# Patient Record
Sex: MALE | Race: BLACK OR AFRICAN AMERICAN | NOT HISPANIC OR LATINO
[De-identification: names, ages, dates, MRNs, and addresses within clinical notes are randomized per-mention and may not be internally consistent; named-entity substitution may affect disease eponyms.]

---

## 2020-03-18 ENCOUNTER — TRANSCRIPTION ENCOUNTER (OUTPATIENT)
Age: 45
End: 2020-03-18

## 2020-05-12 ENCOUNTER — TRANSCRIPTION ENCOUNTER (OUTPATIENT)
Age: 45
End: 2020-05-12

## 2020-06-12 ENCOUNTER — APPOINTMENT (OUTPATIENT)
Dept: UROLOGY | Facility: CLINIC | Age: 45
End: 2020-06-12
Payer: COMMERCIAL

## 2020-06-12 VITALS — TEMPERATURE: 97.6 F

## 2020-06-12 VITALS
BODY MASS INDEX: 25.73 KG/M2 | HEART RATE: 60 BPM | DIASTOLIC BLOOD PRESSURE: 77 MMHG | WEIGHT: 190 LBS | SYSTOLIC BLOOD PRESSURE: 142 MMHG | RESPIRATION RATE: 17 BRPM | HEIGHT: 72 IN | TEMPERATURE: 97 F

## 2020-06-12 PROCEDURE — 99204 OFFICE O/P NEW MOD 45 MIN: CPT

## 2020-06-15 LAB
APPEARANCE: CLEAR
BACTERIA: NEGATIVE
BILIRUBIN URINE: NEGATIVE
BLOOD URINE: NEGATIVE
COLOR: NORMAL
GLUCOSE QUALITATIVE U: NEGATIVE
HYALINE CASTS: 0 /LPF
KETONES URINE: NEGATIVE
LEUKOCYTE ESTERASE URINE: NEGATIVE
MICROSCOPIC-UA: NORMAL
NITRITE URINE: NEGATIVE
PH URINE: 6.5
PROTEIN URINE: NEGATIVE
RED BLOOD CELLS URINE: 0 /HPF
SPECIFIC GRAVITY URINE: 1.01
SQUAMOUS EPITHELIAL CELLS: 0 /HPF
UROBILINOGEN URINE: NORMAL
WHITE BLOOD CELLS URINE: 0 /HPF

## 2020-06-15 NOTE — PHYSICAL EXAM
[General Appearance - Well Nourished] : well nourished [General Appearance - Well Developed] : well developed [Well Groomed] : well groomed [Normal Appearance] : normal appearance [Edema] : no peripheral edema [General Appearance - In No Acute Distress] : no acute distress [Exaggerated Use Of Accessory Muscles For Inspiration] : no accessory muscle use [Respiration, Rhythm And Depth] : normal respiratory rhythm and effort [Abdomen Tenderness] : non-tender [Abdomen Soft] : soft [Costovertebral Angle Tenderness] : no ~M costovertebral angle tenderness [Urethral Meatus] : meatus normal [Scrotum] : the scrotum was normal [Urinary Bladder Findings] : the bladder was normal on palpation [Testes Mass (___cm)] : there were no testicular masses [Normal Station and Gait] : the gait and station were normal for the patient's age [No Focal Deficits] : no focal deficits [Oriented To Time, Place, And Person] : oriented to person, place, and time [] : no rash [Mood] : the mood was normal [Affect] : the affect was normal [Not Anxious] : not anxious [No Palpable Adenopathy] : no palpable adenopathy

## 2020-06-15 NOTE — LETTER BODY
[FreeTextEntry1] : Adriana Cabrera MD\par [address]\[place] [address]\par \par Dear Dr. Cabrera,\par \par I had the pleasure of meeting Weston Grayson in the office today.  As you know, he is a 44-year-old man who was referred for evaluation of a renal cyst.  The cyst was detected when he was found to have an elevation of his creatinine level of around 1.7 mg/dL and a renal sonogram was ordered.  Findings did not show any evidence of hydronephrosis or stone disease but he was found to have a complex renal cyst within the right kidney.  Further imaging was then obtained including an MRI with and without contrast for further assessment.  The MRI has now been done and he is back today for follow-up.  Notably, his serum creatinine level was again repeated just recently and is now 1.91 mg/dL.  The patient does not have any prior known history of medical renal disease.  He denies any prior involvement with a nephrologist.  He has no history of gross hematuria.  He denies any other underlying medical conditions or use of nonsteroidal anti-inflammatories.  He is not on any antihypertensive medication.  He currently takes turmeric and multivitamins.  The patient also denies any prior known history of medical renal disease within his family.\par \par I reviewed the MRI findings with the patient.  They show the cyst within the right kidney to be 3.2 cm and septated but nonenhancing and without any solid components.  This cyst appears to be mildly complex but there is no concern based on the imaging findings related to malignancy.  With these findings in mind, I think it is unlikely he will need any follow-up intervention here and I would only suggest reimaging if he were to develop additional symptoms or other medical issues warranting repeat imaging of the kidneys.\par \par The further increase in his creatinine level is somewhat concerning to me.  He is young and has no other known history that would potentially predispose him to medical renal disease although it appears he may be showing some early signs of this.  I would strongly recommend he see a nephrologist for additional evaluation and I have provided him with the name of a colleague for this purpose.  I have explained to him that he should avoid nonsteroidal anti-inflammatories and remain hydrated to help maximize his renal function and in addition, I explained to him that he would likely need to undergo additional urine and blood work in order to further assess the causes of this recent laboratory finding.\par \par Thank you very much for the kind referral.  Please do not hesitate to contact me with any questions or concerns.\par \par Sincerely,\par \par \par \par \par Gerardo Montes MD, FACS\par  of Urology\par Residency \par [facility]\par \par [facility]\par Director of Robotics and Minimally Invasive Surgery\par [address] [place] [address]\[place] [address]\par P: [phone]\par F: [phone]\par [facility]

## 2020-06-19 ENCOUNTER — TRANSCRIPTION ENCOUNTER (OUTPATIENT)
Age: 45
End: 2020-06-19

## 2020-06-19 ENCOUNTER — APPOINTMENT (OUTPATIENT)
Dept: NEPHROLOGY | Facility: CLINIC | Age: 45
End: 2020-06-19
Payer: COMMERCIAL

## 2020-06-19 DIAGNOSIS — Z80.42 FAMILY HISTORY OF MALIGNANT NEOPLASM OF PROSTATE: ICD-10-CM

## 2020-06-19 LAB — HBA1C TO ESTIMATED AVERAGE GLUCOSE CONVERTER - CONVERTED GLUCOSE: NORMAL

## 2020-06-19 PROCEDURE — 99205 OFFICE O/P NEW HI 60 MIN: CPT | Mod: 95

## 2020-06-19 NOTE — HISTORY OF PRESENT ILLNESS
[Home] : at home, [unfilled] , at the time of the visit. [Medical Office: ([facility])___] : at the medical office located in  [Verbal consent obtained from patient] : the patient, [unfilled] [FreeTextEntry1] : Patient referred by Dr Montes Urology due to elevated creatinine.\par Patient was sent to Dr Montes by PMD (Dr Cabrera, [facility], [facility]) because when she saw bloodwork she saw elevated creatinine level and also a renal cyst.  Was sent to Dr Montes for further evaluation of both. June 3rd renal ultrasound showed right kidney 10.0 cm and a complex cyst 2.7cm largest diameter, left kidney 9.4cm; Pt is 6 feet tall. MRI done subsequently showed RLP 3.2cm septated nonenhancing cyst.  Seen by Dr Montes June 12th, and was told repeat sonos every 6 months and he was referred to me.\par \par Labs notable for cr being elevated 2 months ago in April 1.75 and then 1.91 June 1st. Patient started seeing PMD in April and was told his creatinine was elevated.  He does recall 2-3 years ago, previous primary care doctor told him creatinine levels sightly elevated.  He was not recommended to see a nephrologist.  Does not recall anything abnormal prior to 2-3 years ago. (Dr Ruiz was PMD at that time)\par \par Patient denies medical problems.  Denies HTN, DM; possible pre DM 8 years ago but he improved his diet.  \par Denies hematuria or proteinuria in the urine.  Denies foamy urine or bubbly urine or gross hematuria.  \par Patient tested positive for COVID-19 end of  March.  Had cough and chills, fever sob,104s, no vomiting, no diarrhea.  Was not given anything.  Only took tylenol for headaches at that time. NO NSAIDS or antibiotics given at this time.\par He states he was using NSAIDS ALLEVE 10 years ago a lot at the time of  left knee tibial plateau injury  surgery\par DENIES NSAIDs Now\par \par Reports BP being elevated at PMD office 144/70s; Prior to that BP has always been normal. \par meds: Taking Tumeric pills for years buys vitamin shop or local pharmacy ([employer] NUTRiTION TUMERIC 500mg daily) or [employer]  and fish oil daily 1200mg ([employer]) now due to knee pain. NO NSAIDS, \par denies protein or creatinine supplements now; Was taking protein powders but stopped all in March 2020, had creatine in it. He was taking the Creatinine supplements for years >5 years.  [id number] body building competition had taken diuretics for less than one week; denies anabolic steroids, no injections.  \par \par Surgical hx: tibial plateau surgery\par \par ALL: NKDA\par \par Soc hx: denies smoking, etoh occasion, denies drug use now or in the past.  Patient works HVAC AC/servic tech.  goes to gym.  works out 5-6days a week, lifts weights, resistence. was doing this 2-3 years ago. \par \par Fam hx: father had prostate ca, mother preDM, preHTN, mother mother-GM-DM; Both parents still living.  Patient does not have siblings.  Has two children 20 and 15 years old healthy boys- no pmedical issues for them\par \par \par 6 feet, 190lbs; 2016 he did a body building competition and had to drop to 170s.  Never overweight, obese.  Has muscle mass per patient.

## 2020-07-10 ENCOUNTER — LABORATORY RESULT (OUTPATIENT)
Age: 45
End: 2020-07-10

## 2020-07-13 LAB
ALBUMIN SERPL ELPH-MCNC: 4.8 G/DL
ALBUMIN SERPL ELPH-MCNC: 4.8 G/DL
ALP BLD-CCNC: 68 U/L
ALT SERPL-CCNC: 31 U/L
ANA SER IF-ACNC: NEGATIVE
ANION GAP SERPL CALC-SCNC: 13 MMOL/L
APPEARANCE: CLEAR
APTT BLD: 30.4 SEC
AST SERPL-CCNC: 38 U/L
BACTERIA: NEGATIVE
BASOPHILS # BLD AUTO: 0.06 K/UL
BASOPHILS NFR BLD AUTO: 1.2 %
BILIRUB DIRECT SERPL-MCNC: 0.1 MG/DL
BILIRUB INDIRECT SERPL-MCNC: 0.2 MG/DL
BILIRUB SERPL-MCNC: 0.3 MG/DL
BILIRUBIN URINE: NEGATIVE
BLOOD URINE: NEGATIVE
BUN SERPL-MCNC: 18 MG/DL
C3 SERPL-MCNC: 102 MG/DL
C4 SERPL-MCNC: 29 MG/DL
CALCIUM SERPL-MCNC: 10.2 MG/DL
CHLORIDE SERPL-SCNC: 101 MMOL/L
CK SERPL-CCNC: 1211 U/L
CO2 SERPL-SCNC: 26 MMOL/L
COLOR: NORMAL
CREAT SERPL-MCNC: 1.63 MG/DL
CREAT SPEC-SCNC: 66 MG/DL
CREAT/PROT UR: NORMAL RATIO
DEPRECATED KAPPA LC FREE/LAMBDA SER: 1.69 RATIO
DSDNA AB SER-ACNC: <12 IU/ML
EOSINOPHIL # BLD AUTO: 0.46 K/UL
EOSINOPHIL NFR BLD AUTO: 9.4 %
FERRITIN SERPL-MCNC: 203 NG/ML
GBM AB TITR SER IF: <1 AL
GLUCOSE QUALITATIVE U: NEGATIVE
GLUCOSE SERPL-MCNC: 92 MG/DL
HBV CORE IGG+IGM SER QL: NONREACTIVE
HBV CORE IGM SER QL: NONREACTIVE
HBV SURFACE AB SER QL: REACTIVE
HBV SURFACE AB SERPL IA-ACNC: 392.8 MIU/ML
HCT VFR BLD CALC: 43.5 %
HCV AB SER QL: NONREACTIVE
HCV S/CO RATIO: 0.14 S/CO
HGB BLD-MCNC: 14.6 G/DL
HIV1+2 AB SPEC QL IA.RAPID: NONREACTIVE
HYALINE CASTS: 0 /LPF
IMM GRANULOCYTES NFR BLD AUTO: 0.2 %
INR PPP: 1.07 RATIO
IRON SATN MFR SERPL: 26 %
IRON SERPL-MCNC: 63 UG/DL
KAPPA LC CSF-MCNC: 0.78 MG/DL
KAPPA LC SERPL-MCNC: 1.32 MG/DL
KETONES URINE: NEGATIVE
LEUKOCYTE ESTERASE URINE: NEGATIVE
LYMPHOCYTES # BLD AUTO: 1.83 K/UL
LYMPHOCYTES NFR BLD AUTO: 37.5 %
MAN DIFF?: NORMAL
MCHC RBC-ENTMCNC: 28.7 PG
MCHC RBC-ENTMCNC: 33.6 GM/DL
MCV RBC AUTO: 85.6 FL
MICROSCOPIC-UA: NORMAL
MONOCYTES # BLD AUTO: 0.45 K/UL
MONOCYTES NFR BLD AUTO: 9.2 %
MPO AB + PR3 PNL SER: NORMAL
NEUTROPHILS # BLD AUTO: 2.07 K/UL
NEUTROPHILS NFR BLD AUTO: 42.5 %
NITRITE URINE: NEGATIVE
PH URINE: 6
PHOSPHATE SERPL-MCNC: 3.4 MG/DL
PLATELET # BLD AUTO: 228 K/UL
POTASSIUM SERPL-SCNC: 4.3 MMOL/L
PROT SERPL-MCNC: 7 G/DL
PROT UR-MCNC: <4 MG/DL
PROTEIN URINE: NEGATIVE
PT BLD: 12.6 SEC
RBC # BLD: 5.08 M/UL
RBC # FLD: 13.2 %
RED BLOOD CELLS URINE: 0 /HPF
RPR SER-TITR: NORMAL
SODIUM SERPL-SCNC: 141 MMOL/L
SPECIFIC GRAVITY URINE: 1.01
SQUAMOUS EPITHELIAL CELLS: 0 /HPF
TIBC SERPL-MCNC: 243 UG/DL
UIBC SERPL-MCNC: 180 UG/DL
UROBILINOGEN URINE: NORMAL
WBC # FLD AUTO: 4.88 K/UL
WHITE BLOOD CELLS URINE: 1 /HPF

## 2020-07-15 LAB
ALBUMIN MFR SERPL ELPH: 63 %
ALBUMIN SERPL-MCNC: 4.4 G/DL
ALBUMIN/GLOB SERPL: 1.7 RATIO
ALPHA1 GLOB MFR SERPL ELPH: 3.1 %
ALPHA1 GLOB SERPL ELPH-MCNC: 0.2 G/DL
ALPHA2 GLOB MFR SERPL ELPH: 7.5 %
ALPHA2 GLOB SERPL ELPH-MCNC: 0.5 G/DL
B-GLOBULIN MFR SERPL ELPH: 11.8 %
B-GLOBULIN SERPL ELPH-MCNC: 0.8 G/DL
GAMMA GLOB FLD ELPH-MCNC: 1 G/DL
GAMMA GLOB MFR SERPL ELPH: 14.6 %
INTERPRETATION SERPL IEP-IMP: NORMAL
M PROTEIN SPEC IFE-MCNC: NORMAL
PHOSPHOLIPASE A2 RECEPTOR ELISA: <2 RU/ML
PHOSPHOLIPASE A2 RECEPTOR IFA: NEGATIVE
PROT SERPL-MCNC: 7 G/DL
PROT SERPL-MCNC: 7 G/DL

## 2020-08-18 LAB
ALBUMIN SERPL ELPH-MCNC: 4.8 G/DL
ANION GAP SERPL CALC-SCNC: 18 MMOL/L
APPEARANCE: CLEAR
APTT BLD: 29.8 SEC
BACTERIA: NEGATIVE
BASOPHILS # BLD AUTO: 0.07 K/UL
BASOPHILS NFR BLD AUTO: 1.3 %
BILIRUBIN URINE: NEGATIVE
BLOOD URINE: NEGATIVE
BUN SERPL-MCNC: 15 MG/DL
CALCIUM SERPL-MCNC: 10.2 MG/DL
CHLORIDE SERPL-SCNC: 102 MMOL/L
CK BB SERPL ELPH-CCNC: 0 % (ref 0–?)
CK MB CFR SERPL ELPH: 0 %
CK MM SERPL ELPH-CCNC: 100 %
CK SERPL-CCNC: 839 U/L
CO2 SERPL-SCNC: 24 MMOL/L
COLOR: NORMAL
CREAT SERPL-MCNC: 1.57 MG/DL
CREAT SPEC-SCNC: 134 MG/DL
CREAT/PROT UR: 0.1 RATIO
CREATINE KINASE,TOTAL,SERUM: 820 U/L
CYSTATIN C SERPL-MCNC: 0.89 MG/L
DEPRECATED KAPPA LC FREE/LAMBDA SER: 1.79 RATIO
EOSINOPHIL # BLD AUTO: 0.42 K/UL
EOSINOPHIL NFR BLD AUTO: 7.5 %
GFR/BSA.PRED SERPLBLD CYS-BASED-ARV: 96 ML/MIN
GLUCOSE QUALITATIVE U: NEGATIVE
GLUCOSE SERPL-MCNC: 83 MG/DL
HCT VFR BLD CALC: 47.4 %
HGB BLD-MCNC: 15.2 G/DL
HYALINE CASTS: 0 /LPF
IMM GRANULOCYTES NFR BLD AUTO: 0.4 %
INR PPP: 1 RATIO
KAPPA LC CSF-MCNC: 0.89 MG/DL
KAPPA LC SERPL-MCNC: 1.59 MG/DL
KETONES URINE: NEGATIVE
LEUKOCYTE ESTERASE URINE: NEGATIVE
LYMPHOCYTES # BLD AUTO: 2.09 K/UL
LYMPHOCYTES NFR BLD AUTO: 37.3 %
MACRO TYPE 1: 0 %
MACRO TYPE 2: 0 %
MAN DIFF?: NORMAL
MCHC RBC-ENTMCNC: 28 PG
MCHC RBC-ENTMCNC: 32.1 GM/DL
MCV RBC AUTO: 87.5 FL
MICROSCOPIC-UA: NORMAL
MONOCYTES # BLD AUTO: 0.49 K/UL
MONOCYTES NFR BLD AUTO: 8.8 %
NEUTROPHILS # BLD AUTO: 2.51 K/UL
NEUTROPHILS NFR BLD AUTO: 44.7 %
NITRITE URINE: NEGATIVE
PH URINE: 6.5
PHOSPHATE SERPL-MCNC: 3 MG/DL
PLATELET # BLD AUTO: 248 K/UL
POTASSIUM SERPL-SCNC: 4.3 MMOL/L
PROT UR-MCNC: 7 MG/DL
PROTEIN URINE: NEGATIVE
PT BLD: 11.8 SEC
RBC # BLD: 5.42 M/UL
RBC # FLD: 14.2 %
RED BLOOD CELLS URINE: 0 /HPF
SARS-COV-2 IGG SERPL IA-ACNC: 2.41 INDEX
SARS-COV-2 IGG SERPL QL IA: POSITIVE
SODIUM SERPL-SCNC: 144 MMOL/L
SPECIFIC GRAVITY URINE: 1.02
SQUAMOUS EPITHELIAL CELLS: 0 /HPF
UROBILINOGEN URINE: NORMAL
WBC # FLD AUTO: 5.6 K/UL
WHITE BLOOD CELLS URINE: 0 /HPF

## 2020-09-11 ENCOUNTER — OUTPATIENT (OUTPATIENT)
Dept: OUTPATIENT SERVICES | Facility: HOSPITAL | Age: 45
LOS: 1 days | End: 2020-09-11
Payer: COMMERCIAL

## 2020-09-11 DIAGNOSIS — Z11.59 ENCOUNTER FOR SCREENING FOR OTHER VIRAL DISEASES: ICD-10-CM

## 2020-09-11 PROCEDURE — U0003: CPT

## 2020-09-12 LAB — SARS-COV-2 RNA SPEC QL NAA+PROBE: SIGNIFICANT CHANGE UP

## 2020-09-14 ENCOUNTER — OUTPATIENT (OUTPATIENT)
Dept: OUTPATIENT SERVICES | Facility: HOSPITAL | Age: 45
LOS: 1 days | End: 2020-09-14
Payer: COMMERCIAL

## 2020-09-14 ENCOUNTER — RESULT REVIEW (OUTPATIENT)
Age: 45
End: 2020-09-14

## 2020-09-14 ENCOUNTER — APPOINTMENT (OUTPATIENT)
Dept: ULTRASOUND IMAGING | Facility: HOSPITAL | Age: 45
End: 2020-09-14

## 2020-09-14 DIAGNOSIS — R10.9 UNSPECIFIED ABDOMINAL PAIN: ICD-10-CM

## 2020-09-14 PROCEDURE — 88305 TISSUE EXAM BY PATHOLOGIST: CPT | Mod: 26

## 2020-09-14 PROCEDURE — 88305 TISSUE EXAM BY PATHOLOGIST: CPT

## 2020-09-14 PROCEDURE — 88348 ELECTRON MICROSCOPY DX: CPT

## 2020-09-14 PROCEDURE — 88346 IMFLUOR 1ST 1ANTB STAIN PX: CPT

## 2020-09-14 PROCEDURE — 50200 RENAL BIOPSY PERQ: CPT | Mod: LT

## 2020-09-14 PROCEDURE — 76942 ECHO GUIDE FOR BIOPSY: CPT

## 2020-09-14 PROCEDURE — 88350 IMFLUOR EA ADDL 1ANTB STN PX: CPT | Mod: 26

## 2020-09-14 PROCEDURE — 88350 IMFLUOR EA ADDL 1ANTB STN PX: CPT

## 2020-09-14 PROCEDURE — 88346 IMFLUOR 1ST 1ANTB STAIN PX: CPT | Mod: 26

## 2020-09-14 PROCEDURE — 88313 SPECIAL STAINS GROUP 2: CPT | Mod: 26

## 2020-09-14 PROCEDURE — 88312 SPECIAL STAINS GROUP 1: CPT | Mod: 26

## 2020-09-14 PROCEDURE — 88348 ELECTRON MICROSCOPY DX: CPT | Mod: 26

## 2020-09-14 PROCEDURE — 88312 SPECIAL STAINS GROUP 1: CPT

## 2020-09-14 PROCEDURE — 50200 RENAL BIOPSY PERQ: CPT

## 2020-09-14 PROCEDURE — 88313 SPECIAL STAINS GROUP 2: CPT

## 2020-09-14 PROCEDURE — 76942 ECHO GUIDE FOR BIOPSY: CPT | Mod: 26

## 2020-11-11 ENCOUNTER — APPOINTMENT (OUTPATIENT)
Dept: NEPHROLOGY | Facility: CLINIC | Age: 45
End: 2020-11-11
Payer: COMMERCIAL

## 2020-11-11 VITALS
HEART RATE: 63 BPM | WEIGHT: 198.42 LBS | BODY MASS INDEX: 26.91 KG/M2 | OXYGEN SATURATION: 99 % | DIASTOLIC BLOOD PRESSURE: 83 MMHG | SYSTOLIC BLOOD PRESSURE: 128 MMHG

## 2020-11-11 DIAGNOSIS — Z00.00 ENCOUNTER FOR GENERAL ADULT MEDICAL EXAMINATION W/OUT ABNORMAL FINDINGS: ICD-10-CM

## 2020-11-11 DIAGNOSIS — N28.1 CYST OF KIDNEY, ACQUIRED: ICD-10-CM

## 2020-11-11 PROCEDURE — 99214 OFFICE O/P EST MOD 30 MIN: CPT | Mod: 25,GC

## 2020-11-11 PROCEDURE — 99072 ADDL SUPL MATRL&STAF TM PHE: CPT

## 2020-11-11 RX ORDER — OMEGA-3/DHA/EPA/FISH OIL 1200 MG
1200 CAPSULE ORAL
Refills: 0 | Status: DISCONTINUED | COMMUNITY
Start: 2020-06-19 | End: 2020-11-11

## 2020-11-11 RX ORDER — TURMERIC ROOT EXTRACT 500 MG
TABLET ORAL
Refills: 0 | Status: DISCONTINUED | COMMUNITY
End: 2020-11-11

## 2020-11-13 LAB
25(OH)D3 SERPL-MCNC: 29.1 NG/ML
ALBUMIN SERPL ELPH-MCNC: 4.6 G/DL
ANION GAP SERPL CALC-SCNC: 14 MMOL/L
APPEARANCE: CLEAR
BACTERIA: NEGATIVE
BASOPHILS # BLD AUTO: 0.08 K/UL
BASOPHILS NFR BLD AUTO: 1.5 %
BILIRUBIN URINE: NEGATIVE
BLOOD URINE: NEGATIVE
BUN SERPL-MCNC: 18 MG/DL
CALCIUM SERPL-MCNC: 10.1 MG/DL
CHLORIDE SERPL-SCNC: 104 MMOL/L
CK SERPL-CCNC: 1091 U/L
CO2 SERPL-SCNC: 24 MMOL/L
COLOR: NORMAL
CREAT SERPL-MCNC: 1.37 MG/DL
CREAT SPEC-SCNC: 91 MG/DL
CREAT SPEC-SCNC: 91 MG/DL
CREAT/PROT UR: 0.1 RATIO
CYSTATIN C SERPL-MCNC: 0.83 MG/L
EOSINOPHIL # BLD AUTO: 0.47 K/UL
EOSINOPHIL NFR BLD AUTO: 8.7 %
GFR/BSA.PRED SERPLBLD CYS-BASED-ARV: 105 ML/MIN
GLUCOSE QUALITATIVE U: NEGATIVE
GLUCOSE SERPL-MCNC: 93 MG/DL
HCT VFR BLD CALC: 44.7 %
HGB BLD-MCNC: 14.7 G/DL
HYALINE CASTS: 0 /LPF
IMM GRANULOCYTES NFR BLD AUTO: 0.2 %
KETONES URINE: NEGATIVE
LEUKOCYTE ESTERASE URINE: NEGATIVE
LYMPHOCYTES # BLD AUTO: 2.05 K/UL
LYMPHOCYTES NFR BLD AUTO: 38 %
MAN DIFF?: NORMAL
MCHC RBC-ENTMCNC: 28.5 PG
MCHC RBC-ENTMCNC: 32.9 GM/DL
MCV RBC AUTO: 86.8 FL
MICROALBUMIN 24H UR DL<=1MG/L-MCNC: <1.2 MG/DL
MICROALBUMIN/CREAT 24H UR-RTO: NORMAL MG/G
MICROSCOPIC-UA: NORMAL
MONOCYTES # BLD AUTO: 0.49 K/UL
MONOCYTES NFR BLD AUTO: 9.1 %
NEUTROPHILS # BLD AUTO: 2.29 K/UL
NEUTROPHILS NFR BLD AUTO: 42.5 %
NITRITE URINE: NEGATIVE
PH URINE: 6.5
PHOSPHATE SERPL-MCNC: 3.4 MG/DL
PLATELET # BLD AUTO: 268 K/UL
POTASSIUM SERPL-SCNC: 4.4 MMOL/L
PROT UR-MCNC: 6 MG/DL
PROTEIN URINE: NEGATIVE
RBC # BLD: 5.15 M/UL
RBC # FLD: 13.8 %
RED BLOOD CELLS URINE: 0 /HPF
SODIUM SERPL-SCNC: 142 MMOL/L
SPECIFIC GRAVITY URINE: 1.01
SQUAMOUS EPITHELIAL CELLS: 0 /HPF
UROBILINOGEN URINE: NORMAL
WBC # FLD AUTO: 5.39 K/UL
WHITE BLOOD CELLS URINE: 0 /HPF

## 2020-11-13 NOTE — ASSESSMENT
[FreeTextEntry1] : 45 year old male, muscle mass, AA here for CKD follow up:\par \par # elevated creatinine,  CKD unspecified stage, possible related to MARYCHUY ATN in past (was on multiple supplements)\par - Serum Cr trend: 1.5 (2015), 1.7 (2017), 1.9 (6/2020), 1.75 (07/2020), 1.57 (10/2020), elevated CPK 1211\par - Renal biopsy (9/14/20) showed moderate arterial and arteriolar sclerosis, IFTA 5%, no evidence of immune disease/ATN/AIN\par - Abnormal cr 2-3 years ago, was using creatinine supplements>5years, , exercises frequently and recent covid19 infection end of March 2020 with MARYCHUY and then downtrending creatinine\par - was taking tumeric pills for years- advised to stop\par -advised to stay away from other herbal, OTC ,creatinine supplements, or NSAID, advised to avoid IV contrast\par creatinine stopped march 2020\par - eats high protein diet\par - UA neg prot/blood in June points less likely to underlying GN, and possible NANCI; ?marychuy in setting of covid19 now\par -renal sono smallish kidneys- he is approx 6 ft tall\par -  GN serology negative\par - today will check renal panel, UA, UP/Cr, Cystatin-c\par \par #h/o elevated BP, no h/o htn, today stable BP\par - continue to check BP trends at home;BP at home was 110s-120s/70s; advised to check 1-2x/mos\par \par #renal right kidney complex cyst; sono in june\par - urology and follow up q 6mos per urology

## 2020-11-13 NOTE — REVIEW OF SYSTEMS
[Fever] : no fever [Chills] : no chills [Feeling Poorly] : not feeling poorly [Feeling Tired] : not feeling tired [Sore Throat] : no sore throat [Chest Pain] : no chest pain [Palpitations] : no palpitations [Leg Claudication] : no intermittent leg claudication [Lower Ext Edema] : no extremity edema [Shortness Of Breath] : no shortness of breath [Wheezing] : no wheezing [Cough] : no cough [SOB on Exertion] : no shortness of breath during exertion [Abdominal Pain] : no abdominal pain [Vomiting] : no vomiting [Constipation] : no constipation [Diarrhea] : no diarrhea [Heartburn] : no heartburn [Melena] : no melena [Dysuria] : no dysuria [FreeTextEntry3] : no blurry vision [FreeTextEntry8] : no hematuria, foamy urine, dysuria

## 2020-11-13 NOTE — HISTORY OF PRESENT ILLNESS
[FreeTextEntry1] : 45 y.o male here for CKD follow up\par Last nephrology clinic visit was 6/11/20, since then had renal biopsy on 9/14/20 which showed moderate arterial and arteriolar sclerosis, IFTA 5%, no evidence of immune disease/ATN/AIN\par \par Today patient is without any complaints\par Pt currently not taking any medications nor supplement/OTC\par Currently doesn't check BP, however prior months with average BP 110s/60-70s\par ROS neg

## 2020-11-13 NOTE — PHYSICAL EXAM
[General Appearance - Alert] : alert [General Appearance - In No Acute Distress] : in no acute distress [General Appearance - Well Nourished] : well nourished [General Appearance - Well Developed] : well developed [General Appearance - Well-Appearing] : healthy appearing [Sclera] : the sclera and conjunctiva were normal [Outer Ear] : the ears and nose were normal in appearance [Neck Appearance] : the appearance of the neck was normal [] : no respiratory distress [Respiration, Rhythm And Depth] : normal respiratory rhythm and effort [Exaggerated Use Of Accessory Muscles For Inspiration] : no accessory muscle use [Auscultation Breath Sounds / Voice Sounds] : lungs were clear to auscultation bilaterally [Heart Rate And Rhythm] : heart rate was normal and rhythm regular [Heart Sounds] : normal S1 and S2 [Heart Sounds Gallop] : no gallops [Murmurs] : no murmurs [Heart Sounds Pericardial Friction Rub] : no pericardial rub [Edema] : there was no peripheral edema [Bowel Sounds] : normal bowel sounds [Abdomen Soft] : soft [Abdomen Tenderness] : non-tender [No CVA Tenderness] : no ~M costovertebral angle tenderness [No Spinal Tenderness] : no spinal tenderness [Abnormal Walk] : normal gait [No Focal Deficits] : no focal deficits [Oriented To Time, Place, And Person] : oriented to person, place, and time [Impaired Insight] : insight and judgment were intact [Skin Color & Pigmentation] : normal skin color and pigmentation [FreeTextEntry1] : muscular body

## 2020-11-27 LAB
SARS-COV-2 IGG SERPL IA-ACNC: 65.9 INDEX
SARS-COV-2 IGG SERPL QL IA: POSITIVE

## 2021-03-16 ENCOUNTER — APPOINTMENT (OUTPATIENT)
Dept: NEPHROLOGY | Facility: CLINIC | Age: 46
End: 2021-03-16
Payer: COMMERCIAL

## 2021-03-16 ENCOUNTER — RESULT REVIEW (OUTPATIENT)
Age: 46
End: 2021-03-16

## 2021-03-16 PROCEDURE — 99213 OFFICE O/P EST LOW 20 MIN: CPT | Mod: 95

## 2021-03-16 NOTE — ASSESSMENT
[FreeTextEntry1] : 45 year old male, muscle mass, AA here for CKD follow up:\par \par # elevated creatinine, CKD unspecified stage, possible related to MARYCHUY ATN in past (was on multiple supplements)\par - Serum Cr trend: 1.5 (2015), 1.7 (2017), 1.9 (6/2020), 1.75 (07/2020), 1.57 (10/2020), 1.37 Nov 2020, elevated CPK \par - Renal biopsy (9/14/20) showed moderate arterial and arteriolar sclerosis, IFTA 5%, no evidence of immune disease/ATN/AIN\par - Abnormal cr 2-3 years per patient, was using creatinine supplements>5years, , exercises frequently and   covid19 infection end of March 2020 with MARYCHUY and then downtrending creatinine\par - was taking tumeric pills for years but advised also to stop on prior visit which he did \par -advised to stay away from other herbal, OTC ,creatinine supplements, or NSAID, advised to avoid IV contrast\par creatinine stopped march 2020\par - UA neg prot/blood in June points less likely to underlying GN, and possible NANCI; ?marychuy in setting of covid19 now\par -renal sono smallish kidneys- he is approx 6 ft tall\par - GN serology negative\par -  check renal panel, UA, UP/Cr, Cystatin-c\par \par #h/o elevated BP, no h/o htn, today stable BP\par - continue to check BP trends at home;\par \par #renal right kidney complex cyst; sono in june 2020\par - urology and follow up q 6mos per urology. \par -asked pt to schedule repeat renal sono \par \par  \par spent21 min with visit, and coordinating care for labs, covid vaccine letter given to patient, as well as renal sono script and went over facilities where he can get them done

## 2021-03-16 NOTE — PHYSICAL EXAM
[General Appearance - Alert] : alert [General Appearance - In No Acute Distress] : in no acute distress [Sclera] : the sclera and conjunctiva were normal [Outer Ear] : the ears and nose were normal in appearance [Neck Appearance] : the appearance of the neck was normal [] : no respiratory distress [Oriented To Time, Place, And Person] : oriented to person, place, and time

## 2021-03-16 NOTE — REASON FOR VISIT
[Home] : at home, [unfilled] , at the time of the visit. [Medical Office: ([facility])___] : at the medical office located in  [Verbal consent obtained from patient] : the patient, [unfilled] [Follow-Up] : a follow-up visit

## 2021-03-16 NOTE — HISTORY OF PRESENT ILLNESS
[FreeTextEntry1] : Last nephrology clinic visit was November 2020, since then had renal biopsy on 9/14/20 which showed moderate arterial and arteriolar sclerosis, IFTA 5%, no evidence of immune disease/ATN/AIN\par \par Today patient is without any complaints; televisit done due to covid19 pandemic\par Pt currently not taking any medications nor supplement/OTC\par Currently checks BP, however prior months with average BP 110s/60-70s\par ROS neg \par \par \par Since last visit has not seen any doctors\par No tumeric tablets and nothing herbal\par Checks BP at home intermittently; when he checked it every Saturday /systolic; last time he checked KASIE; \par Exercising 5 days per week- home gym; Feels his weight is stable; lost 5 lbs since january purposely\par

## 2021-03-18 LAB
25(OH)D3 SERPL-MCNC: 32.4 NG/ML
ALBUMIN SERPL ELPH-MCNC: 4.4 G/DL
ANION GAP SERPL CALC-SCNC: 10 MMOL/L
APPEARANCE: CLEAR
BACTERIA: NEGATIVE
BASOPHILS # BLD AUTO: 0.07 K/UL
BASOPHILS NFR BLD AUTO: 1.1 %
BILIRUBIN URINE: NEGATIVE
BLOOD URINE: NEGATIVE
BUN SERPL-MCNC: 19 MG/DL
CALCIUM SERPL-MCNC: 10.3 MG/DL
CHLORIDE SERPL-SCNC: 103 MMOL/L
CK SERPL-CCNC: 1336 U/L
CO2 SERPL-SCNC: 28 MMOL/L
COLOR: COLORLESS
CREAT SERPL-MCNC: 1.57 MG/DL
CREAT SPEC-SCNC: 85 MG/DL
CREAT SPEC-SCNC: 85 MG/DL
CREAT/PROT UR: NORMAL RATIO
CYSTATIN C SERPL-MCNC: 0.88 MG/L
EOSINOPHIL # BLD AUTO: 0.51 K/UL
EOSINOPHIL NFR BLD AUTO: 8.1 %
FERRITIN SERPL-MCNC: 199 NG/ML
GFR/BSA.PRED SERPLBLD CYS-BASED-ARV: 97 ML/MIN
GLUCOSE QUALITATIVE U: NEGATIVE
GLUCOSE SERPL-MCNC: 76 MG/DL
HCT VFR BLD CALC: 45.8 %
HGB BLD-MCNC: 14.8 G/DL
HYALINE CASTS: 0 /LPF
IMM GRANULOCYTES NFR BLD AUTO: 0.2 %
IRON SATN MFR SERPL: 36 %
IRON SERPL-MCNC: 89 UG/DL
KETONES URINE: NEGATIVE
LEUKOCYTE ESTERASE URINE: NEGATIVE
LYMPHOCYTES # BLD AUTO: 2.26 K/UL
LYMPHOCYTES NFR BLD AUTO: 35.9 %
MAN DIFF?: NORMAL
MCHC RBC-ENTMCNC: 27.9 PG
MCHC RBC-ENTMCNC: 32.3 GM/DL
MCV RBC AUTO: 86.3 FL
MICROALBUMIN 24H UR DL<=1MG/L-MCNC: <1.2 MG/DL
MICROALBUMIN/CREAT 24H UR-RTO: NORMAL MG/G
MICROSCOPIC-UA: NORMAL
MONOCYTES # BLD AUTO: 0.58 K/UL
MONOCYTES NFR BLD AUTO: 9.2 %
NEUTROPHILS # BLD AUTO: 2.86 K/UL
NEUTROPHILS NFR BLD AUTO: 45.5 %
NITRITE URINE: NEGATIVE
PH URINE: 6
PHOSPHATE SERPL-MCNC: 3.7 MG/DL
PLATELET # BLD AUTO: 268 K/UL
POTASSIUM SERPL-SCNC: 4.4 MMOL/L
PROT UR-MCNC: <4 MG/DL
PROTEIN URINE: NEGATIVE
RBC # BLD: 5.31 M/UL
RBC # FLD: 14 %
RED BLOOD CELLS URINE: 1 /HPF
SODIUM SERPL-SCNC: 141 MMOL/L
SPECIFIC GRAVITY URINE: 1.01
SQUAMOUS EPITHELIAL CELLS: 0 /HPF
TIBC SERPL-MCNC: 247 UG/DL
UIBC SERPL-MCNC: 158 UG/DL
UROBILINOGEN URINE: NORMAL
WBC # FLD AUTO: 6.29 K/UL
WHITE BLOOD CELLS URINE: 1 /HPF

## 2021-03-19 ENCOUNTER — OUTPATIENT (OUTPATIENT)
Dept: OUTPATIENT SERVICES | Facility: HOSPITAL | Age: 46
LOS: 1 days | End: 2021-03-19
Payer: COMMERCIAL

## 2021-03-19 ENCOUNTER — APPOINTMENT (OUTPATIENT)
Dept: ULTRASOUND IMAGING | Facility: IMAGING CENTER | Age: 46
End: 2021-03-19
Payer: COMMERCIAL

## 2021-03-19 DIAGNOSIS — N18.9 CHRONIC KIDNEY DISEASE, UNSPECIFIED: ICD-10-CM

## 2021-03-19 DIAGNOSIS — Z00.8 ENCOUNTER FOR OTHER GENERAL EXAMINATION: ICD-10-CM

## 2021-03-19 PROCEDURE — 76770 US EXAM ABDO BACK WALL COMP: CPT | Mod: 26

## 2021-03-19 PROCEDURE — 76770 US EXAM ABDO BACK WALL COMP: CPT

## 2021-06-03 ENCOUNTER — INPATIENT (INPATIENT)
Facility: HOSPITAL | Age: 46
LOS: 2 days | Discharge: HOME | DRG: 176 | End: 2021-06-06
Attending: INTERNAL MEDICINE | Admitting: INTERNAL MEDICINE
Payer: COMMERCIAL

## 2021-06-03 VITALS
DIASTOLIC BLOOD PRESSURE: 78 MMHG | HEART RATE: 82 BPM | HEIGHT: 72 IN | OXYGEN SATURATION: 98 % | RESPIRATION RATE: 20 BRPM | WEIGHT: 190.04 LBS | TEMPERATURE: 99 F | SYSTOLIC BLOOD PRESSURE: 130 MMHG

## 2021-06-03 DIAGNOSIS — E11.9 TYPE 2 DIABETES MELLITUS WITHOUT COMPLICATIONS: ICD-10-CM

## 2021-06-03 DIAGNOSIS — I26.99 OTHER PULMONARY EMBOLISM WITHOUT ACUTE COR PULMONALE: ICD-10-CM

## 2021-06-03 DIAGNOSIS — R07.9 CHEST PAIN, UNSPECIFIED: ICD-10-CM

## 2021-06-03 DIAGNOSIS — N17.9 ACUTE KIDNEY FAILURE, UNSPECIFIED: ICD-10-CM

## 2021-06-03 DIAGNOSIS — N18.9 CHRONIC KIDNEY DISEASE, UNSPECIFIED: ICD-10-CM

## 2021-06-03 DIAGNOSIS — Z29.9 ENCOUNTER FOR PROPHYLACTIC MEASURES, UNSPECIFIED: ICD-10-CM

## 2021-06-03 LAB
ALBUMIN SERPL ELPH-MCNC: 4.5 G/DL — SIGNIFICANT CHANGE UP (ref 3.3–5)
ALP SERPL-CCNC: 82 U/L — SIGNIFICANT CHANGE UP (ref 40–120)
ALT FLD-CCNC: 30 U/L — SIGNIFICANT CHANGE UP (ref 10–45)
ANION GAP SERPL CALC-SCNC: 12 MMOL/L — SIGNIFICANT CHANGE UP (ref 5–17)
APPEARANCE UR: CLEAR — SIGNIFICANT CHANGE UP
APTT BLD: 30.6 SEC — SIGNIFICANT CHANGE UP (ref 27.5–35.5)
AST SERPL-CCNC: 35 U/L — SIGNIFICANT CHANGE UP (ref 10–40)
BASOPHILS # BLD AUTO: 0.06 K/UL — SIGNIFICANT CHANGE UP (ref 0–0.2)
BASOPHILS NFR BLD AUTO: 0.6 % — SIGNIFICANT CHANGE UP (ref 0–2)
BILIRUB SERPL-MCNC: 0.5 MG/DL — SIGNIFICANT CHANGE UP (ref 0.2–1.2)
BILIRUB UR-MCNC: NEGATIVE — SIGNIFICANT CHANGE UP
BUN SERPL-MCNC: 18 MG/DL — SIGNIFICANT CHANGE UP (ref 7–23)
CALCIUM SERPL-MCNC: 10.4 MG/DL — SIGNIFICANT CHANGE UP (ref 8.4–10.5)
CHLORIDE SERPL-SCNC: 101 MMOL/L — SIGNIFICANT CHANGE UP (ref 96–108)
CO2 SERPL-SCNC: 25 MMOL/L — SIGNIFICANT CHANGE UP (ref 22–31)
COLOR SPEC: SIGNIFICANT CHANGE UP
CREAT SERPL-MCNC: 1.58 MG/DL — HIGH (ref 0.5–1.3)
D DIMER BLD IA.RAPID-MCNC: 337 NG/ML DDU — HIGH
DIFF PNL FLD: NEGATIVE — SIGNIFICANT CHANGE UP
EOSINOPHIL # BLD AUTO: 0.49 K/UL — SIGNIFICANT CHANGE UP (ref 0–0.5)
EOSINOPHIL NFR BLD AUTO: 4.6 % — SIGNIFICANT CHANGE UP (ref 0–6)
GLUCOSE SERPL-MCNC: 102 MG/DL — HIGH (ref 70–99)
GLUCOSE UR QL: NEGATIVE — SIGNIFICANT CHANGE UP
HCT VFR BLD CALC: 45.6 % — SIGNIFICANT CHANGE UP (ref 39–50)
HGB BLD-MCNC: 14.9 G/DL — SIGNIFICANT CHANGE UP (ref 13–17)
IMM GRANULOCYTES NFR BLD AUTO: 0.2 % — SIGNIFICANT CHANGE UP (ref 0–1.5)
INR BLD: 1.04 RATIO — SIGNIFICANT CHANGE UP (ref 0.88–1.16)
KETONES UR-MCNC: NEGATIVE — SIGNIFICANT CHANGE UP
LEUKOCYTE ESTERASE UR-ACNC: NEGATIVE — SIGNIFICANT CHANGE UP
LYMPHOCYTES # BLD AUTO: 2.12 K/UL — SIGNIFICANT CHANGE UP (ref 1–3.3)
LYMPHOCYTES # BLD AUTO: 20 % — SIGNIFICANT CHANGE UP (ref 13–44)
MCHC RBC-ENTMCNC: 28 PG — SIGNIFICANT CHANGE UP (ref 27–34)
MCHC RBC-ENTMCNC: 32.7 GM/DL — SIGNIFICANT CHANGE UP (ref 32–36)
MCV RBC AUTO: 85.6 FL — SIGNIFICANT CHANGE UP (ref 80–100)
MONOCYTES # BLD AUTO: 1.41 K/UL — HIGH (ref 0–0.9)
MONOCYTES NFR BLD AUTO: 13.3 % — SIGNIFICANT CHANGE UP (ref 2–14)
NEUTROPHILS # BLD AUTO: 6.49 K/UL — SIGNIFICANT CHANGE UP (ref 1.8–7.4)
NEUTROPHILS NFR BLD AUTO: 61.3 % — SIGNIFICANT CHANGE UP (ref 43–77)
NITRITE UR-MCNC: NEGATIVE — SIGNIFICANT CHANGE UP
NRBC # BLD: 0 /100 WBCS — SIGNIFICANT CHANGE UP (ref 0–0)
PH UR: 6.5 — SIGNIFICANT CHANGE UP (ref 5–8)
PLATELET # BLD AUTO: 256 K/UL — SIGNIFICANT CHANGE UP (ref 150–400)
POTASSIUM SERPL-MCNC: 5 MMOL/L — SIGNIFICANT CHANGE UP (ref 3.5–5.3)
POTASSIUM SERPL-SCNC: 5 MMOL/L — SIGNIFICANT CHANGE UP (ref 3.5–5.3)
PROT SERPL-MCNC: 7.8 G/DL — SIGNIFICANT CHANGE UP (ref 6–8.3)
PROT UR-MCNC: NEGATIVE — SIGNIFICANT CHANGE UP
PROTHROM AB SERPL-ACNC: 12.4 SEC — SIGNIFICANT CHANGE UP (ref 10.6–13.6)
RBC # BLD: 5.33 M/UL — SIGNIFICANT CHANGE UP (ref 4.2–5.8)
RBC # FLD: 14 % — SIGNIFICANT CHANGE UP (ref 10.3–14.5)
SARS-COV-2 RNA SPEC QL NAA+PROBE: SIGNIFICANT CHANGE UP
SODIUM SERPL-SCNC: 138 MMOL/L — SIGNIFICANT CHANGE UP (ref 135–145)
SP GR SPEC: 1.02 — SIGNIFICANT CHANGE UP (ref 1.01–1.02)
TROPONIN T, HIGH SENSITIVITY RESULT: <6 NG/L — SIGNIFICANT CHANGE UP (ref 0–51)
UROBILINOGEN FLD QL: NEGATIVE — SIGNIFICANT CHANGE UP
WBC # BLD: 10.59 K/UL — HIGH (ref 3.8–10.5)
WBC # FLD AUTO: 10.59 K/UL — HIGH (ref 3.8–10.5)

## 2021-06-03 PROCEDURE — 93010 ELECTROCARDIOGRAM REPORT: CPT | Mod: NC

## 2021-06-03 PROCEDURE — 71275 CT ANGIOGRAPHY CHEST: CPT | Mod: 26,MA

## 2021-06-03 PROCEDURE — 71046 X-RAY EXAM CHEST 2 VIEWS: CPT | Mod: 26

## 2021-06-03 PROCEDURE — 93308 TTE F-UP OR LMTD: CPT | Mod: 26

## 2021-06-03 PROCEDURE — 99285 EMERGENCY DEPT VISIT HI MDM: CPT

## 2021-06-03 RX ORDER — SODIUM CHLORIDE 9 MG/ML
1000 INJECTION INTRAMUSCULAR; INTRAVENOUS; SUBCUTANEOUS ONCE
Refills: 0 | Status: COMPLETED | OUTPATIENT
Start: 2021-06-03 | End: 2021-06-03

## 2021-06-03 RX ORDER — SODIUM CHLORIDE 9 MG/ML
1000 INJECTION INTRAMUSCULAR; INTRAVENOUS; SUBCUTANEOUS
Refills: 0 | Status: DISCONTINUED | OUTPATIENT
Start: 2021-06-03 | End: 2021-06-05

## 2021-06-03 RX ORDER — ACETAMINOPHEN 500 MG
650 TABLET ORAL ONCE
Refills: 0 | Status: COMPLETED | OUTPATIENT
Start: 2021-06-03 | End: 2021-06-03

## 2021-06-03 RX ORDER — APIXABAN 2.5 MG/1
10 TABLET, FILM COATED ORAL ONCE
Refills: 0 | Status: COMPLETED | OUTPATIENT
Start: 2021-06-03 | End: 2021-06-03

## 2021-06-03 RX ORDER — PANTOPRAZOLE SODIUM 20 MG/1
40 TABLET, DELAYED RELEASE ORAL
Refills: 0 | Status: DISCONTINUED | OUTPATIENT
Start: 2021-06-03 | End: 2021-06-06

## 2021-06-03 RX ORDER — APIXABAN 2.5 MG/1
10 TABLET, FILM COATED ORAL EVERY 12 HOURS
Refills: 0 | Status: DISCONTINUED | OUTPATIENT
Start: 2021-06-03 | End: 2021-06-06

## 2021-06-03 RX ORDER — MORPHINE SULFATE 50 MG/1
4 CAPSULE, EXTENDED RELEASE ORAL ONCE
Refills: 0 | Status: DISCONTINUED | OUTPATIENT
Start: 2021-06-03 | End: 2021-06-03

## 2021-06-03 RX ADMIN — SODIUM CHLORIDE 100 MILLILITER(S): 9 INJECTION INTRAMUSCULAR; INTRAVENOUS; SUBCUTANEOUS at 17:21

## 2021-06-03 RX ADMIN — Medication 650 MILLIGRAM(S): at 12:38

## 2021-06-03 RX ADMIN — Medication 1 TABLET(S): at 12:42

## 2021-06-03 RX ADMIN — MORPHINE SULFATE 4 MILLIGRAM(S): 50 CAPSULE, EXTENDED RELEASE ORAL at 15:28

## 2021-06-03 RX ADMIN — Medication 100 MILLIGRAM(S): at 12:43

## 2021-06-03 RX ADMIN — SODIUM CHLORIDE 1000 MILLILITER(S): 9 INJECTION INTRAMUSCULAR; INTRAVENOUS; SUBCUTANEOUS at 14:00

## 2021-06-03 RX ADMIN — Medication 650 MILLIGRAM(S): at 20:33

## 2021-06-03 RX ADMIN — SODIUM CHLORIDE 1000 MILLILITER(S): 9 INJECTION INTRAMUSCULAR; INTRAVENOUS; SUBCUTANEOUS at 15:01

## 2021-06-03 RX ADMIN — APIXABAN 10 MILLIGRAM(S): 2.5 TABLET, FILM COATED ORAL at 22:10

## 2021-06-03 RX ADMIN — APIXABAN 10 MILLIGRAM(S): 2.5 TABLET, FILM COATED ORAL at 15:07

## 2021-06-03 RX ADMIN — SODIUM CHLORIDE 1000 MILLILITER(S): 9 INJECTION INTRAMUSCULAR; INTRAVENOUS; SUBCUTANEOUS at 09:43

## 2021-06-03 RX ADMIN — SODIUM CHLORIDE 1000 MILLILITER(S): 9 INJECTION INTRAMUSCULAR; INTRAVENOUS; SUBCUTANEOUS at 10:00

## 2021-06-03 RX ADMIN — Medication 650 MILLIGRAM(S): at 09:43

## 2021-06-03 RX ADMIN — Medication 650 MILLIGRAM(S): at 18:54

## 2021-06-03 NOTE — ED PROVIDER NOTE - OBJECTIVE STATEMENT
44 y/o M w/ pmhx of provoked PE 10 yrs ago s/p tibial plateau surgery presents today c/o chest and L flank/back pain since Tuesday. Pt also mentions he had a fever of 101 yesterday. Took aspirin last night. Has not taken analgesia today. Pt states pain is worse w/ deep inspiration and when he lays down. Pt denies recent chest trauma, n/v, abd pain, dysuria. Denies hx of kidney stones. Pt denies tobacco, alcohol or recreational drug use 44 y/o M w/ pmhx of provoked PE 10 yrs ago s/p tibial plateau surgery presents today c/o chest and L flank/back pain since Tuesday. Pt also mentions he had a fever of 101 yesterday. Took aspirin last night since he could not sleep and pain was constant last night. Has not taken analgesia today. Pt states pain is worse w/ deep inspiration and when he lays down. Pt denies recent chest trauma, cough, n/v, abd pain, dysuria. Denies hx of kidney stones. Pt denies tobacco, alcohol or recreational drug use 46 y/o M w/ pmhx of provoked PE 10 yrs ago s/p tibial plateau surgery, had covid last year, presents today c/o chest and L flank/back pain since Tuesday. Pt also mentions he had a fever of 101 yesterday. Took aspirin last night since he could not sleep and pain was constant last night. Has not taken analgesia today. Pt states pain is worse w/ deep inspiration and when he lays down. Pt denies recent chest trauma, cough, n/v, abd pain, dysuria. Denies hx of kidney stones. Pt denies tobacco, alcohol or recreational drug use

## 2021-06-03 NOTE — H&P ADULT - NSHPLABSRESULTS_GEN_ALL_CORE
< from: CT Angio Chest PE Protocol w/ IV Cont (06.03.21 @ 13:41) >      IMPRESSION:    Pulmonary emboli noted within the bilateral lower lobe segmental and subsegmental vessels with associated minimal groundglass and linear opacities in the periphery suggestive of early infarcts. No evidence of right heart strain.

## 2021-06-03 NOTE — H&P ADULT - NSHPREVIEWOFSYSTEMS_GEN_ALL_CORE
REVIEW OF SYSTEMS:    CONSTITUTIONAL: No weakness, fevers or chills  EYES/ENT: No visual changes;  No vertigo or throat pain   NECK: No pain or stiffness  RESPIRATORY: SOB, pleuritic chest pain   CARDIOVASCULAR: chest pain   GASTROINTESTINAL: No abdominal or epigastric pain. No nausea, vomiting, or hematemesis; No diarrhea or constipation. No melena or hematochezia.  GENITOURINARY: No dysuria, frequency or hematuria  NEUROLOGICAL: No numbness or weakness  SKIN: No itching, burning, rashes, or lesions   All other review of systems is negative unless indicated above.

## 2021-06-03 NOTE — H&P ADULT - PROBLEM SELECTOR PLAN 3
unknown baseline  IV hydration  S/P CTA chest, hold further contrast study for now till repeat blood work  oral hydration states that had a hx of elevated BUN/Cr after his covid infection and underwent kidney biopsy with no acute findings   IV hydration  S/P CTA chest, hold further contrast study for now till repeat blood work  oral hydration  House renal eval tomorrow AM for evaluation   avoid nephrotoxic medications

## 2021-06-03 NOTE — H&P ADULT - PROBLEM SELECTOR PLAN 2
likely due to acute PE  serial CE and EKG  Ech ordereed  card eval called   monitor vitals likely due to acute PE  serial CE and EKG  Echo ordered  card glen called   monitor vitals

## 2021-06-03 NOTE — H&P ADULT - PROBLEM SELECTOR PLAN 5
DVT and gI PPX     Differential diagnosis and plan of care discussed with patient after the evaluation.   Advanced care planning/advanced directives discussed with patient/family. DNR status including forceful chest compressions to attempt to restart the heart, ventilator support/artificial breathing, electric shock, artificial nutrition, health care proxy, Molst form all discussed with pt. Pt wishes to consider.   OMT on six regions for acute somatic dysfunctions done at the bedside   Importance of Fall prevention discussed.  I had a prolonged conversation with patient. More than 50% of the visit was spent counseling and/or coordinating care by the attending physician.  total of one hundred and twenty mins spent on total encounter

## 2021-06-03 NOTE — H&P ADULT - PROBLEM SELECTOR PLAN 1
acute B/L lower lobe PE  started on full dose eliquis 10 BID followed with 5 BID  CTA chest noted  obtain CT abd/pelv with IV contrast for malignancy W/U tomorrow after repeat BUN/Cr  LE DVT study  CHeck Echo  pulm eval   serial CE and EKG  hyper coag W/U  Crad eval   monitor vitlas and hemodynamics  Check O2 sat on ambulation acute B/L lower lobe PE  started on full dose eliquis 10 BID followed with 5 BID  CTA chest noted  obtain CT abd/pelv with IV contrast for malignancy W/U tomorrow after repeat BUN/Cr  LE DVT study  CHeck Echo  pulm eval   serial CE and EKG  hyper coag W/U  Card eval   monitor vitlas and hemodynamics  Check O2 sat on ambulation

## 2021-06-03 NOTE — H&P ADULT - NSHPPHYSICALEXAM_GEN_ALL_CORE
Vital Signs Last 24 Hrs  T(C): 36.8 (03 Jun 2021 09:40), Max: 37.1 (03 Jun 2021 08:33)  T(F): 98.3 (03 Jun 2021 09:40), Max: 98.8 (03 Jun 2021 08:33)  HR: 69 (03 Jun 2021 15:11) (66 - 82)  BP: 124/77 (03 Jun 2021 15:11) (124/77 - 135/67)  BP(mean): --  RR: 18 (03 Jun 2021 15:11) (18 - 20)  SpO2: 99% (03 Jun 2021 15:11) (98% - 99%)

## 2021-06-03 NOTE — H&P ADULT - ASSESSMENT
Patient is a 44 y/o Male with pmhx of provoked PE 10 yrs ago s/p tibial plateau surgery, DM, had covid last year, presents today c/o chest and L flank/back pain since Tuesday. Pt also mentions he had a fever of 101 yesterday. Took aspirin last night since he could not sleep and pain was constant last night. Has not taken analgesia today. Pt states pain is worse w/ deep inspiration and when he lays down. Pt denies recent chest trauma, cough, n/v, abd pain, dysuria. Denies hx of kidney stones. Pt denies tobacco, alcohol or recreational drug use. found to have B/L lower lobe PE

## 2021-06-03 NOTE — ED PROVIDER NOTE - CLINICAL SUMMARY MEDICAL DECISION MAKING FREE TEXT BOX
O'Christopher DO PGY-1: pt w/ hx of provoked PE 10 yrs ago s/p surgury presents today c/o pain since Tuesday in the L lower chest and L flank pain. LAst night pt had fever of 101. Had covid last year. Today concern for nephrolithiasis vs pneumonia vs PE vs low suspicion of pericarditis (pain worse when supine). Ordered labs, imaging, fluids, analgesia. Will reassess OSuzanne DO PGY-1: pt w/ hx of provoked PE 10 yrs ago s/p surgury presents today c/o pain since Tuesday in the L lower chest and L flank pain. LAst night pt had fever of 101. Had covid last year. Today concern for nephrolithiasis vs pneumonia vs PE vs MSK (has been exercising and works as ) vs low suspicion of pericarditis (pain worse when supine). Ordered labs, imaging, fluids, analgesia. Will reassess O[place] DO PGY-1: pt w/ hx of provoked PE 10 yrs ago s/p surgury presents today c/o pain since Tuesday in the L lower chest and L flank pain. LAst night pt had fever of 101. Had covid last year. Today concern for nephrolithiasis vs pneumonia vs PE vs MSK (has been exercising and works as ) vs low suspicion of pericarditis (pain worse when supine). Ordered labs, imaging, fluids, analgesia. Will reassess    Lucy: Patient with history of provoked PE x 10 years ago s/p surgery here with left sided pleuritic like chest pain since Tuesday. not tachycardic, saturating 100%.  will get labs, cxr, d-dimer and patientdoes not PERC out., pain control, reassess.

## 2021-06-03 NOTE — CONSULT NOTE ADULT - SUBJECTIVE AND OBJECTIVE BOX
PULMONARY CONSULT    HPI: 46 y/o M with PMH of provoked PE 10 yrs ago s/p tibial plateau surgery, had COVID last year. Presents today with c/o chest and L flank/back pain since Tuesday. Pt also mentions he had a fever of 101 yesterday. Took aspirin last night since he could not sleep and pain was constant last night. Pt states pain is worse w/ deep inspiration and when he lays down. CTA chest with b/l LL segmental and subsegmental PE with early infarcts.       PAST MEDICAL & SURGICAL HISTORY:  Diabetic   diet control  Orthopedic Aftercare   Knee Dislocation     Allergies  No Known Allergies    FAMILY HISTORY:    Social history:     Review of Systems:  CONSTITUTIONAL: Per above   EYES: No eye pain, visual disturbances, or discharge  ENMT:  No difficulty hearing, tinnitus, vertigo; No sinus or throat pain  NECK: No pain or stiffness  RESPIRATORY: Per above  CARDIOVASCULAR: No chest pain, palpitations, dizziness, or leg swelling  GASTROINTESTINAL: No abdominal or epigastric pain. No nausea, vomiting, or hematemesis; No diarrhea or constipation. No melena or hematochezia.  GENITOURINARY: No dysuria, frequency, hematuria, or incontinence  NEUROLOGICAL: No headaches, memory loss, loss of strength, numbness, or tremors  SKIN: No itching, burning, rashes, or lesions   MUSCULOSKELETAL: No joint pain or swelling; No muscle, back, or extremity pain  PSYCHIATRIC: No depression, anxiety, mood swings, or difficulty sleeping      Medications:  MEDICATIONS  (STANDING):        Vital Signs Last 24 Hrs  T(C): 36.8 (2021 09:40), Max: 37.1 (2021 08:33)  T(F): 98.3 (2021 09:40), Max: 98.8 (2021 08:33)  HR: 69 (2021 15:11) (66 - 82)  BP: 124/77 (2021 15:11) (124/77 - 135/67)  BP(mean): --  RR: 18 (2021 15:11) (18 - 20)  SpO2: 99% (2021 15:11) (98% - 99%)                LABS:                        14.9   10.59 )-----------( 256      ( 2021 09:54 )             45.6     06-03    138  |  101  |  18  ----------------------------<  102<H>  5.0   |  25  |  1.58<H>    Ca    10.4      2021 09:54    TPro  7.8  /  Alb  4.5  /  TBili  0.5  /  DBili  x   /  AST  35  /  ALT  30  /  AlkPhos  82              PT/INR - ( 2021 14:36 )   PT: 12.4 sec;   INR: 1.04 ratio         PTT - ( 2021 14:36 )  PTT:30.6 sec  Urinalysis Basic - ( 2021 11:45 )    Color: Light Yellow / Appearance: Clear / S.016 / pH: x  Gluc: x / Ketone: Negative  / Bili: Negative / Urobili: Negative   Blood: x / Protein: Negative / Nitrite: Negative   Leuk Esterase: Negative / RBC: x / WBC x   Sq Epi: x / Non Sq Epi: x / Bacteria: x              Physical Examination:    General: No acute distress.      HEENT: Pupils equal, reactive to light.  Symmetric.    PULM: Clear to auscultation bilaterally, no significant sputum production    CVS: S1, S2    ABD: Soft, nondistended, nontender, normoactive bowel sounds, no masses    EXT: No edema, nontender    SKIN: Warm and well perfused, no rashes noted.    NEURO: Alert, oriented, interactive, nonfocal    RADIOLOGY REVIEWED    CT chest: < from: CT Angio Chest PE Protocol w/ IV Cont (21 @ 13:41) >  Comparison: 2009    Tubes/Lines: None    Mediastinum and Heart: Aorta and pulmonary arteries are normal in size. Thyroid gland is unremarkable. No lymphadenopathy. No pericardial effusion.    Lungs, Pleura, and Airways: Pulmonary emboli noted within the bilateral lower lobe segmental and subsegmental vessels with associatedminimal groundglass and linear opacities in the periphery suggestive of early infarct. No evidence of right heart strain.    Visualized Abdomen: Unremarkable.    Bones and soft tissues: Unremarkable.    IMPRESSION:    Pulmonary emboli noted within the bilateral lower lobe segmental and subsegmental vessels with associated minimal groundglass and linear opacities in the periphery suggestive of early infarcts. No evidence of right heart strain.       PULMONARY CONSULT    HPI: 46 y/o M with PMH of provoked PE 10 yrs ago s/p tibial plateau surgery, had COVID last year. Presents today with c/o chest and L flank/back pain since Tuesday. Pt also mentions he had a fever of 101 yesterday. Took aspirin last night since he could not sleep and pain was constant last night. Pt states pain is worse w/ deep inspiration and when he lays down. CTA chest with b/l LL segmental and subsegmental PE with early infarcts. Denies trauma, LE edema, wheezing.       PAST MEDICAL & SURGICAL HISTORY:  Diabetic   diet control  Orthopedic Aftercare   Knee Dislocation     Allergies  No Known Allergies    FAMILY HISTORY: non contributory     Social history: never a smoker     Review of Systems:  CONSTITUTIONAL: Per above   EYES: No eye pain, visual disturbances, or discharge  ENMT:  No difficulty hearing, tinnitus, vertigo; No sinus or throat pain  NECK: No pain or stiffness  RESPIRATORY: Per above  CARDIOVASCULAR: Per above   GASTROINTESTINAL: No abdominal or epigastric pain. No nausea, vomiting, or hematemesis; No diarrhea or constipation. No melena or hematochezia.  GENITOURINARY: No dysuria, frequency, hematuria, or incontinence  NEUROLOGICAL: No headaches, memory loss, loss of strength, numbness, or tremors  SKIN: No itching, burning, rashes, or lesions   MUSCULOSKELETAL: No joint pain or swelling; No muscle, back, or extremity pain  PSYCHIATRIC: No depression, anxiety, mood swings, or difficulty sleeping      Medications:  MEDICATIONS  (STANDING):        Vital Signs Last 24 Hrs  T(C): 36.8 (2021 09:40), Max: 37.1 (2021 08:33)  T(F): 98.3 (2021 09:40), Max: 98.8 (2021 08:33)  HR: 69 (2021 15:11) (66 - 82)  BP: 124/77 (2021 15:11) (124/77 - 135/67)  BP(mean): --  RR: 18 (2021 15:11) (18 - 20)  SpO2: 99% (2021 15:11) (98% - 99%)                LABS:                        14.9   10.59 )-----------( 256      ( 2021 09:54 )             45.6     06-03    138  |  101  |  18  ----------------------------<  102<H>  5.0   |  25  |  1.58<H>    Ca    10.4      2021 09:54    TPro  7.8  /  Alb  4.5  /  TBili  0.5  /  DBili  x   /  AST  35  /  ALT  30  /  AlkPhos  82  -            PT/INR - ( 2021 14:36 )   PT: 12.4 sec;   INR: 1.04 ratio         PTT - ( 2021 14:36 )  PTT:30.6 sec  Urinalysis Basic - ( 2021 11:45 )    Color: Light Yellow / Appearance: Clear / S.016 / pH: x  Gluc: x / Ketone: Negative  / Bili: Negative / Urobili: Negative   Blood: x / Protein: Negative / Nitrite: Negative   Leuk Esterase: Negative / RBC: x / WBC x   Sq Epi: x / Non Sq Epi: x / Bacteria: x              Physical Examination:    General: No acute distress.      HEENT: Pupils equal, reactive to light.  Symmetric.    PULM: Clear to auscultation bilaterally, no significant sputum production    CVS: RRR    ABD: Soft, nondistended, nontender, normoactive bowel sounds, no masses    EXT: No edema, nontender    SKIN: Warm and well perfused, no rashes noted.    NEURO: Alert, oriented, interactive, nonfocal      RADIOLOGY REVIEWED    CT chest: < from: CT Angio Chest PE Protocol w/ IV Cont (21 @ 13:41) >  Comparison: 2009    Tubes/Lines: None    Mediastinum and Heart: Aorta and pulmonary arteries are normal in size. Thyroid gland is unremarkable. No lymphadenopathy. No pericardial effusion.    Lungs, Pleura, and Airways: Pulmonary emboli noted within the bilateral lower lobe segmental and subsegmental vessels with associatedminimal groundglass and linear opacities in the periphery suggestive of early infarct. No evidence of right heart strain.    Visualized Abdomen: Unremarkable.    Bones and soft tissues: Unremarkable.    IMPRESSION:    Pulmonary emboli noted within the bilateral lower lobe segmental and subsegmental vessels with associated minimal groundglass and linear opacities in the periphery suggestive of early infarcts. No evidence of right heart strain.

## 2021-06-03 NOTE — ED PROVIDER NOTE - PROGRESS NOTE DETAILS
O'Christopher DO PGY-1: informed pt of results. Discussed the d-dimer. Risk v benefits discussed. Pt wants CTA given his history. Will hydrate patient O'Christopher DO PGY-1: tried calling pt's pcp no answer yet O'Christopher DO PGY-1: informed pt of results. Discussed the d-dimer. Risk v benefits discussed. will get cta pe, Will hydrate patient

## 2021-06-03 NOTE — ED PROVIDER NOTE - NS ED ROS FT
CONSTITUTIONAL +fever, No diaphoresis, No weight change  EYES - No eye pain, No blurred vision  ENT - No change in hearing, No sore throat, No neck pain, No rhinorrhea, No ear pain  RESPIRATORY - No shortness of breath, No cough  CARDIAC +chest pain, No palpitations  GI - No abdominal pain, No nausea, No vomiting, No diarrhea, No constipation  - No dysuria, no frequency, no hematuria.   MUSCULOSKELETAL +Left flank pain  NEUROLOGIC - No numbness, No focal weakness, No headache, No dizziness

## 2021-06-03 NOTE — PATIENT PROFILE ADULT - NSPRESCRALCSIXMORE_GEN_A_NUR
Attempted to call. One number just rings.  The other number was answered by a male that stated it was the wrong number Never

## 2021-06-03 NOTE — ED ADULT NURSE REASSESSMENT NOTE - NS ED NURSE REASSESS COMMENT FT1
pt resting comfortably on the stretcher, pt denies SOB, states he has CP when he takes a deep breath. MD aware.

## 2021-06-03 NOTE — CONSULT NOTE ADULT - PROBLEM SELECTOR RECOMMENDATION 9
B/l lower lobe segmental and subsegmental vessels  -This is his second clot (last one 10 years ago post surgery)  -AC with Eliquis. Will need lifelong AC.  -Troponin normal, pro-BNP added to labs  -Check TTE  -Check LE duplex  -Suggest hypercoagulable w/u  -Plan for CT A/P w/ IV and PO contrast tomorrow pending creatinine   -Consider hematology eval  -Normoxic B/l lower lobe segmental and subsegmental vessels  -This is his second clot (last one 10 years ago post surgery)  -AC with Eliquis. Will need lifelong AC.  -Troponin normal, pro-BNP added to labs  -Check TTE  -Check LE duplex  -Suggest hypercoagulable w/u  -Plan for CT A/P w/ IV and PO contrast tomorrow pending creatinine   -Consider hematology eval  -Normoxic  -Maintain sat>92% w o2

## 2021-06-03 NOTE — ED PROVIDER NOTE - PHYSICAL EXAMINATION
CONSTITUTIONAL: Well-developed; well-nourished; in no acute distress.   SKIN: warm, dry  HEAD: Normocephalic; atraumatic.  EYES: no conjunctival injection.   ENT: No nasal discharge; airway clear.  NECK: Supple; non tender.  CARD: S1, S2 normal; no murmurs, gallops, or rubs. Regular rate and rhythm.   RESP: No wheezes, rales or rhonchi. Good air movement bilaterally.   ABD: soft ntnd, no guarding, no distention, no rigidity. +Left CVA tenderness   EXT: Ambulates independently.  No cyanosis or edema. No calf tenderness or swelling   NEURO: Alert, oriented, grossly unremarkable  PSYCH: Cooperative, appropriate.

## 2021-06-03 NOTE — H&P ADULT - HISTORY OF PRESENT ILLNESS
Patient is a 46 y/o Male with pmhx of provoked PE 10 yrs ago s/p tibial plateau surgery, DM, had covid last year, presents today c/o chest and L flank/back pain since Tuesday. Pt also mentions he had a fever of 101 yesterday. Took aspirin last night since he could not sleep and pain was constant last night. Has not taken analgesia today. Pt states pain is worse w/ deep inspiration and when he lays down. Pt denies recent chest trauma, cough, n/v, abd pain, dysuria. Denies hx of kidney stones. Pt denies tobacco, alcohol or recreational drug use

## 2021-06-03 NOTE — ED ADULT NURSE REASSESSMENT NOTE - NS ED NURSE REASSESS COMMENT FT1
pt states he still has pain, pain has not changed or decreased, pt on cardiac monitor. safety precautions in place. MD aware.

## 2021-06-03 NOTE — ED ADULT NURSE NOTE - OBJECTIVE STATEMENT
44 y/o male PMHX 60% function of right kidney, presents with complaints of left sided CP 8/10 constant radiating to left lower back since yesterday, pt denies any fall or heavy lifting, pt denies any fever, chills, nausea, vomiting or diarrhea, pt states he had a 101 fever yesterday and took aspirin. pt afebrile non diaphoretic, pt denies any abdominal pain, denies any urinary symptoms. denies any headache, blurry or double vision. pt placed on cardiac monitor, safety precautions in place, call bell in reach.

## 2021-06-03 NOTE — CONSULT NOTE ADULT - PROBLEM SELECTOR RECOMMENDATION 2
groundglass and linear opacities in the periphery suggestive of early infarcts  -Cause of pleuritic pain  -Pain control

## 2021-06-04 LAB
A1C WITH ESTIMATED AVERAGE GLUCOSE RESULT: 5.7 % — HIGH (ref 4–5.6)
ALBUMIN SERPL ELPH-MCNC: 4.1 G/DL — SIGNIFICANT CHANGE UP (ref 3.3–5)
ALP SERPL-CCNC: 72 U/L — SIGNIFICANT CHANGE UP (ref 40–120)
ALT FLD-CCNC: 36 U/L — SIGNIFICANT CHANGE UP (ref 10–45)
ANION GAP SERPL CALC-SCNC: 15 MMOL/L — SIGNIFICANT CHANGE UP (ref 5–17)
AST SERPL-CCNC: 31 U/L — SIGNIFICANT CHANGE UP (ref 10–40)
BASOPHILS # BLD AUTO: 0.06 K/UL — SIGNIFICANT CHANGE UP (ref 0–0.2)
BASOPHILS NFR BLD AUTO: 0.7 % — SIGNIFICANT CHANGE UP (ref 0–2)
BILIRUB SERPL-MCNC: 0.4 MG/DL — SIGNIFICANT CHANGE UP (ref 0.2–1.2)
BUN SERPL-MCNC: 14 MG/DL — SIGNIFICANT CHANGE UP (ref 7–23)
CALCIUM SERPL-MCNC: 9.6 MG/DL — SIGNIFICANT CHANGE UP (ref 8.4–10.5)
CHLORIDE SERPL-SCNC: 105 MMOL/L — SIGNIFICANT CHANGE UP (ref 96–108)
CHOLEST SERPL-MCNC: 177 MG/DL — SIGNIFICANT CHANGE UP
CO2 SERPL-SCNC: 20 MMOL/L — LOW (ref 22–31)
COVID-19 SPIKE DOMAIN AB INTERP: POSITIVE
COVID-19 SPIKE DOMAIN ANTIBODY RESULT: >250 U/ML — HIGH
CREAT SERPL-MCNC: 1.39 MG/DL — HIGH (ref 0.5–1.3)
CULTURE RESULTS: SIGNIFICANT CHANGE UP
EOSINOPHIL # BLD AUTO: 0.4 K/UL — SIGNIFICANT CHANGE UP (ref 0–0.5)
EOSINOPHIL NFR BLD AUTO: 4.4 % — SIGNIFICANT CHANGE UP (ref 0–6)
ESTIMATED AVERAGE GLUCOSE: 117 MG/DL — HIGH (ref 68–114)
GLUCOSE SERPL-MCNC: 121 MG/DL — HIGH (ref 70–99)
HCT VFR BLD CALC: 44.1 % — SIGNIFICANT CHANGE UP (ref 39–50)
HDLC SERPL-MCNC: 56 MG/DL — SIGNIFICANT CHANGE UP
HGB BLD-MCNC: 14.4 G/DL — SIGNIFICANT CHANGE UP (ref 13–17)
IMM GRANULOCYTES NFR BLD AUTO: 0.4 % — SIGNIFICANT CHANGE UP (ref 0–1.5)
LIPID PNL WITH DIRECT LDL SERPL: 109 MG/DL — HIGH
LYMPHOCYTES # BLD AUTO: 1.2 K/UL — SIGNIFICANT CHANGE UP (ref 1–3.3)
LYMPHOCYTES # BLD AUTO: 13.2 % — SIGNIFICANT CHANGE UP (ref 13–44)
MCHC RBC-ENTMCNC: 27.9 PG — SIGNIFICANT CHANGE UP (ref 27–34)
MCHC RBC-ENTMCNC: 32.7 GM/DL — SIGNIFICANT CHANGE UP (ref 32–36)
MCV RBC AUTO: 85.5 FL — SIGNIFICANT CHANGE UP (ref 80–100)
MONOCYTES # BLD AUTO: 0.85 K/UL — SIGNIFICANT CHANGE UP (ref 0–0.9)
MONOCYTES NFR BLD AUTO: 9.4 % — SIGNIFICANT CHANGE UP (ref 2–14)
NEUTROPHILS # BLD AUTO: 6.54 K/UL — SIGNIFICANT CHANGE UP (ref 1.8–7.4)
NEUTROPHILS NFR BLD AUTO: 71.9 % — SIGNIFICANT CHANGE UP (ref 43–77)
NON HDL CHOLESTEROL: 120 MG/DL — SIGNIFICANT CHANGE UP
NRBC # BLD: 0 /100 WBCS — SIGNIFICANT CHANGE UP (ref 0–0)
PLATELET # BLD AUTO: 236 K/UL — SIGNIFICANT CHANGE UP (ref 150–400)
POTASSIUM SERPL-MCNC: 4.1 MMOL/L — SIGNIFICANT CHANGE UP (ref 3.5–5.3)
POTASSIUM SERPL-SCNC: 4.1 MMOL/L — SIGNIFICANT CHANGE UP (ref 3.5–5.3)
PROT SERPL-MCNC: 7.2 G/DL — SIGNIFICANT CHANGE UP (ref 6–8.3)
RBC # BLD: 5.16 M/UL — SIGNIFICANT CHANGE UP (ref 4.2–5.8)
RBC # FLD: 14 % — SIGNIFICANT CHANGE UP (ref 10.3–14.5)
SARS-COV-2 IGG+IGM SERPL QL IA: >250 U/ML — HIGH
SARS-COV-2 IGG+IGM SERPL QL IA: POSITIVE
SODIUM SERPL-SCNC: 140 MMOL/L — SIGNIFICANT CHANGE UP (ref 135–145)
SPECIMEN SOURCE: SIGNIFICANT CHANGE UP
TRIGL SERPL-MCNC: 58 MG/DL — SIGNIFICANT CHANGE UP
TSH SERPL-MCNC: 1.16 UIU/ML — SIGNIFICANT CHANGE UP (ref 0.27–4.2)
WBC # BLD: 9.09 K/UL — SIGNIFICANT CHANGE UP (ref 3.8–10.5)
WBC # FLD AUTO: 9.09 K/UL — SIGNIFICANT CHANGE UP (ref 3.8–10.5)

## 2021-06-04 PROCEDURE — 93970 EXTREMITY STUDY: CPT | Mod: 26

## 2021-06-04 PROCEDURE — 93306 TTE W/DOPPLER COMPLETE: CPT | Mod: 26

## 2021-06-04 PROCEDURE — 99222 1ST HOSP IP/OBS MODERATE 55: CPT | Mod: GC

## 2021-06-04 RX ORDER — OXYCODONE HYDROCHLORIDE 5 MG/1
5 TABLET ORAL EVERY 4 HOURS
Refills: 0 | Status: DISCONTINUED | OUTPATIENT
Start: 2021-06-04 | End: 2021-06-06

## 2021-06-04 RX ORDER — ACETAMINOPHEN 500 MG
650 TABLET ORAL EVERY 6 HOURS
Refills: 0 | Status: DISCONTINUED | OUTPATIENT
Start: 2021-06-04 | End: 2021-06-06

## 2021-06-04 RX ORDER — OXYCODONE HYDROCHLORIDE 5 MG/1
5 TABLET ORAL ONCE
Refills: 0 | Status: DISCONTINUED | OUTPATIENT
Start: 2021-06-04 | End: 2021-06-04

## 2021-06-04 RX ADMIN — SODIUM CHLORIDE 100 MILLILITER(S): 9 INJECTION INTRAMUSCULAR; INTRAVENOUS; SUBCUTANEOUS at 12:00

## 2021-06-04 RX ADMIN — OXYCODONE HYDROCHLORIDE 5 MILLIGRAM(S): 5 TABLET ORAL at 13:00

## 2021-06-04 RX ADMIN — OXYCODONE HYDROCHLORIDE 5 MILLIGRAM(S): 5 TABLET ORAL at 18:00

## 2021-06-04 RX ADMIN — OXYCODONE HYDROCHLORIDE 5 MILLIGRAM(S): 5 TABLET ORAL at 17:00

## 2021-06-04 RX ADMIN — OXYCODONE HYDROCHLORIDE 5 MILLIGRAM(S): 5 TABLET ORAL at 06:36

## 2021-06-04 RX ADMIN — OXYCODONE HYDROCHLORIDE 5 MILLIGRAM(S): 5 TABLET ORAL at 07:30

## 2021-06-04 RX ADMIN — PANTOPRAZOLE SODIUM 40 MILLIGRAM(S): 20 TABLET, DELAYED RELEASE ORAL at 06:36

## 2021-06-04 RX ADMIN — OXYCODONE HYDROCHLORIDE 5 MILLIGRAM(S): 5 TABLET ORAL at 12:00

## 2021-06-04 RX ADMIN — APIXABAN 10 MILLIGRAM(S): 2.5 TABLET, FILM COATED ORAL at 10:48

## 2021-06-04 RX ADMIN — APIXABAN 10 MILLIGRAM(S): 2.5 TABLET, FILM COATED ORAL at 22:18

## 2021-06-04 NOTE — CONSULT NOTE ADULT - ASSESSMENT
Patient is a 44 y/o M w PMH of provoked PE 10 years ago after tibial plateau surgery, DM, prior covid infection last year, presented to [facility] c/o of chest pain and L flank pain since Tuesday. Admitted for b/l lower lobe pulmonary embolism. Nephrology consulted for CKD management.     #Elevated Scr in setting of increase muscle mass  Cystatin C level done as outpatient shows EGFR C of 97. On review of labs on [facility], Scr was 1.57 on 3/16/21, then Scr on this admission was 1.58 on 6/3/21, and today was 1.39. Continue to monitor Scr and urine output.     If any questions, please feel free to contact me     Laura Stewart  Nephrology Fellow  [facility] Pager: [phone]  [facility] Pager: [phone]    
Patient is a 46 y/o Male with pmhx of provoked PE 10 yrs ago s/p tibial plateau surgery, DM, had covid last year, presents today c/o chest and L flank/back pain since Tuesday. Pt also mentions he had a fever of 101 yesterday. Took aspirin last night since he could not sleep and pain was constant last night. Has not taken analgesia today. Pt states pain is worse w/ deep inspiration and when he lays down. Pt denies recent chest trauma, cough, n/v, abd pain, dysuria. Denies hx of kidney stones. Pt denies tobacco, alcohol or recreational drug use (03 Jun 2021 16:02)    Hematology/Oncology consulted to see patient with recurrent bilateral pulmonary embolus - Patient had a PE 10-11 years ago s/P surgery. Patient reports he came to ED with mild chest discomfort.  No family history of blood clots.  No recent travel, illnesses or injuries. Patient very active.    Pulmonary Embolus, DVT  --First event was provoked  --This event was unprovoked.  --Patient has been started on Apixaban 10 mg PO BID - may transition to 5 mg PO BID after 1 week  --Duration of treatment dependant on thrombophilia workup but patient will likely need prolonged anticoagulation    R/O Thrombophilia  --Need to rule out malignancy  --Tumor markers paraneoplastic panel ordered  --Have ordered lupus profile, Protein C, Protein S, AT3  --CT C/A/P to be ordered by primary team  --Patient will need additional testing after discharge (Factor V Leiden, Prothrombin Gene Mutation, JAK2 with reflex)    Renal insufficiency  --Being monitored by Nephrology    After discharge the patient may follow with Dr. Gregory Clark.    Thank you for the opportunity to participate in Mr. Grayson's care    oJny Jama PA-C  Hematology/Oncology  [facility]   [phone] (cell)  [phone] (office)  [phone] (alt office)  Evenings and weekends please call MD on call or office  
46 y/o M with PMH of provoked PE 10 yrs ago s/p tibial plateau surgery, had COVID last year. Presents today with c/o chest and L flank/back pain since Tuesday. CTA chest with b/l LL segmental and subsegmental PE with early infarcts. Sats 99% RA.

## 2021-06-04 NOTE — CONSULT NOTE ADULT - SUBJECTIVE AND OBJECTIVE BOX
[facility] DIVISION OF KIDNEY DISEASES AND HYPERTENSION -- [phone]  -- INITIAL CONSULT NOTE  --------------------------------------------------------------------------------  HPI: Patient is a 44 y/o M w PMH of provoked PE 10 years ago after tibial plateau surgery, DM, prior covid infection last year, presented to [facility] c/o of chest pain and L flank pain since Tuesday. Admitted for b/l lower lobe pulmonary embolism. Nephrology consulted for CKD management. On review of labs on [facility]/[place], Scr was 1.57 on 3/16/21, then Scr on this admission was 1.58 on 6/3/21, and today was 1.39.     Patient was seen and examined at bedside. Reported feeling well. Denies fever, chills, nausea, vomiting, diarrhea or LE edema.     PAST HISTORY  --------------------------------------------------------------------------------  PAST MEDICAL & SURGICAL HISTORY:  Diabetic (ICD9 250.00)  diet control    Orthopedic Aftercare (ICD9 V54.9)    Knee Dislocation (ICD9 836.50)    FAMILY HISTORY:    PAST SOCIAL HISTORY:    ALLERGIES & MEDICATIONS  --------------------------------------------------------------------------------  Allergies    No Known Allergies    Intolerances    Standing Inpatient Medications  apixaban 10 milliGRAM(s) Oral every 12 hours  pantoprazole    Tablet 40 milliGRAM(s) Oral before breakfast  sodium chloride 0.9%. 1000 milliLiter(s) IV Continuous <Continuous>    PRN Inpatient Medications  oxyCODONE    IR 5 milliGRAM(s) Oral every 4 hours PRN      REVIEW OF SYSTEMS  --------------------------------------------------------------------------------  Gen: No fevers/chills  Skin: No rashes  Respiratory: no dyspnea   CV: + chest pain  GI: No abdominal pain, diarrhea  : No dysuria, hematuria  MSK: No  edema    All other systems were reviewed and are negative, except as noted.    VITALS/PHYSICAL EXAM  --------------------------------------------------------------------------------  T(C): 37.1 (06-04-21 @ 12:25), Max: 37.7 (06-03-21 @ 20:38)  HR: 82 (06-04-21 @ 12:25) (69 - 85)  BP: 125/74 (06-04-21 @ 12:25) (109/66 - 131/80)  RR: 18 (06-04-21 @ 12:25) (18 - 18)  SpO2: 96% (06-04-21 @ 12:25) (95% - 99%)  Wt(kg): --  Height (cm): 182.9 (06-03-21 @ 08:33)  Weight (kg): 86.2 (06-03-21 @ 21:33)  BMI (kg/m2): 25.8 (06-03-21 @ 21:33)  BSA (m2): 2.08 (06-03-21 @ 21:33)    06-04-21 @ 07:01  -  06-04-21 @ 14:23  --------------------------------------------------------  IN: 480 mL / OUT: 0 mL / NET: 480 mL    Physical Exam:  	Gen: NAD  	HEENT: MMM  	Pulm: CTA B/L  	CV: S1S2  	Abd: Soft, +BS   	Ext: No LE edema B/L  	Neuro: Awake  	Skin: Warm and dry    LABS/STUDIES  --------------------------------------------------------------------------------              14.4   9.09  >-----------<  236      [06-04-21 @ 07:05]              44.1     140  |  105  |  14  ----------------------------<  121      [06-04-21 @ 07:05]  4.1   |  20  |  1.39        Ca     9.6     [06-04-21 @ 07:05]    TPro  7.2  /  Alb  4.1  /  TBili  0.4  /  DBili  x   /  AST  31  /  ALT  36  /  AlkPhos  72  [06-04-21 @ 07:05]    PT/INR: PT 12.4 , INR 1.04       [06-03-21 @ 14:36]  PTT: 30.6       [06-03-21 @ 14:36]      Creatinine Trend:  SCr 1.39 [06-04 @ 07:05]  SCr 1.58 [06-03 @ 09:54]    Urinalysis - [06-03-21 @ 11:45]      Color Light Yellow / Appearance Clear / SG 1.016 / pH 6.5      Gluc Negative / Ketone Negative  / Bili Negative / Urobili Negative       Blood Negative / Protein Negative / Leuk Est Negative / Nitrite Negative      RBC  / WBC  / Hyaline  / Gran  / Sq Epi  / Non Sq Epi  / Bacteria       TSH 1.16      [06-04-21 @ 09:36]  Lipid: chol 177, TG 58, HDL 56, LDL --      [06-04-21 @ 09:35]

## 2021-06-04 NOTE — CONSULT NOTE ADULT - SUBJECTIVE AND OBJECTIVE BOX
CHIEF COMPLAINT:Patient is a 45y old  Male who presents with a chief complaint of PE (04 Jun 2021 11:56)      HISTORY OF PRESENT ILLNESS:HPI:  Patient is a 46 y/o Male with pmhx of provoked PE 10 yrs ago s/p tibial plateau surgery, DM, had covid last year, presents today c/o chest and L flank/back pain since Tuesday. Pt also mentions he had a fever of 101 yesterday. Took aspirin last night since he could not sleep and pain was constant last night. Has not taken analgesia today. Pt states pain is worse w/ deep inspiration and when he lays down. Pt denies recent chest trauma, cough, n/v, abd pain, dysuria. Denies hx of kidney stones. Pt denies tobacco, alcohol or recreational drug use (03 Jun 2021 16:02)      PAST MEDICAL & SURGICAL HISTORY:  Diabetic (ICD9 250.00)  diet control    Orthopedic Aftercare (ICD9 V54.9)    Knee Dislocation (ICD9 836.50)            MEDICATIONS:  apixaban 10 milliGRAM(s) Oral every 12 hours        oxyCODONE    IR 5 milliGRAM(s) Oral every 4 hours PRN    pantoprazole    Tablet 40 milliGRAM(s) Oral before breakfast      sodium chloride 0.9%. 1000 milliLiter(s) IV Continuous <Continuous>      FAMILY HISTORY:      Non-contributory    SOCIAL HISTORY:    [ ] Tobacco  [ ] Drugs  [ ] Alcohol    Allergies    No Known Allergies    Intolerances    	    REVIEW OF SYSTEMS:  CONSTITUTIONAL: No fever  EYES: No eye pain, visual disturbances, or discharge  ENMT:  No difficulty hearing, tinnitus  NECK: No pain or stiffness  RESPIRATORY: No cough, wheezing,  CARDIOVASCULAR: + chest pain, palpitations, passing out, dizziness, or leg swelling  GASTROINTESTINAL:  No nausea, vomiting, diarrhea or constipation. No melena.  GENITOURINARY: No dysuria, hematuria  NEUROLOGICAL: No stroke like symptoms  SKIN: No burning or lesions   ENDOCRINE: No heat or cold intolerance  MUSCULOSKELETAL: No joint pain or swelling  PSYCHIATRIC: No  anxiety, mood swings  HEME/LYMPH: No bleeding gums  ALLERGY AND IMMUNOLOGIC: No hives or eczema	    All other ROS negative    PHYSICAL EXAM:  T(C): 37.1 (06-04-21 @ 12:25), Max: 37.7 (06-03-21 @ 20:38)  HR: 82 (06-04-21 @ 12:25) (69 - 85)  BP: 125/74 (06-04-21 @ 12:25) (109/66 - 131/80)  RR: 18 (06-04-21 @ 12:25) (18 - 18)  SpO2: 96% (06-04-21 @ 12:25) (95% - 99%)  Wt(kg): --  I&O's Summary    04 Jun 2021 07:01  -  04 Jun 2021 12:46  --------------------------------------------------------  IN: 240 mL / OUT: 0 mL / NET: 240 mL        Appearance: Normal	  HEENT:   Normal oral mucosa, EOMI	  Cardiovascular:  S1 S2, No JVD,    Respiratory: Lungs clear to auscultation	  Psychiatry: Alert  Gastrointestinal:  Soft, Non-tender, + BS	  Skin: No rashes   Neurologic: Non-focal  Extremities:  No edema  Vascular: Peripheral pulses palpable    	    	  CARDIAC MARKERS:  Labs personally reviewed by me                        14.4   9.09  )-----------( 236      ( 04 Jun 2021 07:05 )             44.1     06-04    140  |  105  |  14  ----------------------------<  121<H>  4.1   |  20<L>  |  1.39<H>    Ca    9.6      04 Jun 2021 07:05    TPro  7.2  /  Alb  4.1  /  TBili  0.4  /  DBili  x   /  AST  31  /  ALT  36  /  AlkPhos  72  06-04  EKG: Personally reviewed by me -   Radiology: Personally reviewed by me -   < from: US TTE 2D F/U, Limited w/o Contrast (ED) (06.03.21 @ 14:47) >    INTERPRETATION:  A focused transthoracic cardiac ultrasound examination was performed.  No pericardial effusion was present.  No global wall motion abnormality was identified.  No evidence of right ventricular strain.    IMPRESSION:  No Pericardial Effusion.      < end of copied text >  < from: CT Angio Chest PE Protocol w/ IV Cont (06.03.21 @ 13:41) >  IMPRESSION:    Pulmonary emboli noted within the bilateral lower lobe segmental and subsegmental vessels with associated minimal groundglass and linear opacities in the periphery suggestive of early infarcts. No evidence of right heart strain.    The findings were discussed with Dr. Mclean  at time of final signing.    < end of copied text >    Assessment /Plan:   Patient is a 46 y/o Male with pmhx of provoked PE 10 yrs ago s/p tibial plateau surgery, DM, had covid last year, presents today c/o chest and L flank/back pain since Tuesday. Pt also mentions he had a fever of 101 yesterday. Took aspirin last night since he could not sleep and pain was constant last night. Has not taken analgesia today. Pt states pain is worse w/ deep inspiration and when he lays down. Pt denies recent chest trauma, cough, n/v, abd pain, dysuria. Denies hx of kidney stones. Pt denies tobacco, alcohol or recreational drug use. found to have B/L lower lobe PE       Problem/Plan - 1:  ·  Problem: Chest pain.  Plan: likely due to acute PE, worsens with inspiration, more pleuritic in nature.  CE- negative  pending TTE   EKG nonischemic   tx for PE       Problem/Plan - 2:  ·  Problem: Pulmonary embolism.  Plan: acute B/L lower lobe PE  full dose eliquis 10 BID followed with 5 BID  obtain CT abd/pelv with IV contrast for malignancy w/u  LE DVT study  TTE   appreciate pulm reccs    Problem/Plan - 3:  ·  Problem: MARYCHUY (acute kidney injury).  Plan: states that had a hx of elevated BUN/Cr after his covid infection and underwent kidney biopsy with no acute findings   avoid nephrotoxic medications.s/p IVF   continue to monitor     Problem/Plan - 4:  ·  Problem: Diabetes.  Plan: Sliding scale  prediabetes  consistent carb    Problem/Plan - 5:  ·  Problem: Prophylactic measure.  Plan: DVT and gI PPX           Susu Lynne ANP-C  Danilo Covington DO [facility]  Cardiovascular Medicine  [address] [place] [address], [address]  Office [phone]  Cell [phone] CHIEF COMPLAINT:Patient is a 45y old  Male who presents with a chief complaint of PE (04 Jun 2021 11:56)      HISTORY OF PRESENT ILLNESS:HPI:  Patient is a 44 y/o Male with pmhx of provoked PE 10 yrs ago s/p tibial plateau surgery, DM, had covid last year, presents today c/o chest and L flank/back pain since Tuesday. Pt also mentions he had a fever of 101 yesterday. Took aspirin last night since he could not sleep and pain was constant last night. Has not taken analgesia today. Pt states pain is worse w/ deep inspiration and when he lays down. Pt denies recent chest trauma, cough, n/v, abd pain, dysuria. Denies hx of kidney stones. Pt denies tobacco, alcohol or recreational drug use (03 Jun 2021 16:02)      PAST MEDICAL & SURGICAL HISTORY:  Diabetic (ICD9 250.00)  diet control    Orthopedic Aftercare (ICD9 V54.9)    Knee Dislocation (ICD9 836.50)            MEDICATIONS:  apixaban 10 milliGRAM(s) Oral every 12 hours        oxyCODONE    IR 5 milliGRAM(s) Oral every 4 hours PRN    pantoprazole    Tablet 40 milliGRAM(s) Oral before breakfast      sodium chloride 0.9%. 1000 milliLiter(s) IV Continuous <Continuous>      FAMILY HISTORY:      Non-contributory    SOCIAL HISTORY:    [ ] Tobacco  [ ] Drugs  [ ] Alcohol    Allergies    No Known Allergies    Intolerances    	    REVIEW OF SYSTEMS:  CONSTITUTIONAL: No fever  EYES: No eye pain, visual disturbances, or discharge  ENMT:  No difficulty hearing, tinnitus  NECK: No pain or stiffness  RESPIRATORY: No cough, wheezing,  CARDIOVASCULAR: + chest pain, palpitations, passing out, dizziness, or leg swelling  GASTROINTESTINAL:  No nausea, vomiting, diarrhea or constipation. No melena.  GENITOURINARY: No dysuria, hematuria  NEUROLOGICAL: No stroke like symptoms  SKIN: No burning or lesions   ENDOCRINE: No heat or cold intolerance  MUSCULOSKELETAL: No joint pain or swelling  PSYCHIATRIC: No  anxiety, mood swings  HEME/LYMPH: No bleeding gums  ALLERGY AND IMMUNOLOGIC: No hives or eczema	    All other ROS negative    PHYSICAL EXAM:  T(C): 37.1 (06-04-21 @ 12:25), Max: 37.7 (06-03-21 @ 20:38)  HR: 82 (06-04-21 @ 12:25) (69 - 85)  BP: 125/74 (06-04-21 @ 12:25) (109/66 - 131/80)  RR: 18 (06-04-21 @ 12:25) (18 - 18)  SpO2: 96% (06-04-21 @ 12:25) (95% - 99%)  Wt(kg): --  I&O's Summary    04 Jun 2021 07:01  -  04 Jun 2021 12:46  --------------------------------------------------------  IN: 240 mL / OUT: 0 mL / NET: 240 mL        Appearance: Normal	  HEENT:   Normal oral mucosa, EOMI	  Cardiovascular:  S1 S2, No JVD,    Respiratory: Lungs clear to auscultation	  Psychiatry: Alert  Gastrointestinal:  Soft, Non-tender, + BS	  Skin: No rashes   Neurologic: Non-focal  Extremities:  No edema  Vascular: Peripheral pulses palpable    	    	  CARDIAC MARKERS:  Labs personally reviewed by me                        14.4   9.09  )-----------( 236      ( 04 Jun 2021 07:05 )             44.1     06-04    140  |  105  |  14  ----------------------------<  121<H>  4.1   |  20<L>  |  1.39<H>    Ca    9.6      04 Jun 2021 07:05    TPro  7.2  /  Alb  4.1  /  TBili  0.4  /  DBili  x   /  AST  31  /  ALT  36  /  AlkPhos  72  06-04  EKG: Personally reviewed by me -   Radiology: Personally reviewed by me -   < from: US TTE 2D F/U, Limited w/o Contrast (ED) (06.03.21 @ 14:47) >    INTERPRETATION:  A focused transthoracic cardiac ultrasound examination was performed.  No pericardial effusion was present.  No global wall motion abnormality was identified.  No evidence of right ventricular strain.    IMPRESSION:  No Pericardial Effusion.      < end of copied text >  < from: CT Angio Chest PE Protocol w/ IV Cont (06.03.21 @ 13:41) >  IMPRESSION:    Pulmonary emboli noted within the bilateral lower lobe segmental and subsegmental vessels with associated minimal groundglass and linear opacities in the periphery suggestive of early infarcts. No evidence of right heart strain.    The findings were discussed with Dr. Mclean  at time of final signing.    < end of copied text >    Assessment /Plan:   Patient is a 44 y/o Male with pmhx of provoked PE 10 yrs ago s/p tibial plateau surgery, DM, had covid last year, presents today c/o chest and L flank/back pain since Tuesday. Pt also mentions he had a fever of 101 yesterday. Took aspirin last night since he could not sleep and pain was constant last night. Has not taken analgesia today. Pt states pain is worse w/ deep inspiration and when he lays down. Pt denies recent chest trauma, cough, n/v, abd pain, dysuria. Denies hx of kidney stones. Pt denies tobacco, alcohol or recreational drug use. found to have B/L lower lobe PE       Problem/Plan - 1:  .·  Problem: Chest pain.  Plan: likely due to acute PE, worsens with inspiration, more pleuritic in nature.  CE- negative  pending TTE   EKG nonischemic   tx for PE       Problem/Plan - 2:  ·  Problem: Pulmonary embolism.  Plan: acute B/L lower lobe PE  full dose eliquis 10 BID followed with 5 BID  obtain CT abd/pelv with IV contrast for malignancy w/u  LE DVT study  TTE   appreciate pulm reccs    Problem/Plan - 3:  ·  Problem: MARYCHUY (acute kidney injury).  Plan: states that had a hx of elevated BUN/Cr after his covid infection and underwent kidney biopsy with no acute findings   avoid nephrotoxic medications.s/p IVF   continue to monitor     Problem/Plan - 4:  ·  Problem: Diabetes.  Plan: Sliding scale  prediabetes  consistent carb    Problem/Plan - 5:  ·  Problem: Prophylactic measure.  Plan: DVT and gI PPX       Advanced care planning/advanced directives discussed with patient/family. DNR status including forceful chest compressions to attempt to restart the heart, ventilator support/artificial breathing, electric shock, artificial nutrition, health care proxy, Molst form all discussed with pt. Pt wishes to consider.  More than fifteen minutes spent on discussing advanced directives. OMT on six regions for acute somatic dysfunctions done at the bedside.  One hundred ten minutes spent on total encounter, of which more than fifty percent of the encounter was spent on counseling and/or coordinating care by the attending physician.        Susu Covington DO [facility]  Cardiovascular Medicine  [address] [place] [address], [address]  Office [phone]  Cell [phone]

## 2021-06-04 NOTE — CONSULT NOTE ADULT - ATTENDING COMMENTS
Long standing elevated creatinine.  But when checked with cystatin-C, estimated GFR is approximately 100 (consistently).    On kidney bx of 2020:  - Tubular atrophy and interstitial fibrosis (5% of cortex)   - Moderate arterial and arteriolar sclerosis     Now with PE. Receiving IVF, no edema. Has some chest discomfort.    No CKD. Please note that creatinine GFR severely underestimates his true kidney function.  No specific renal precautions are necessary. Dose meds for normal kidney function.  Pain control, PE and DM management per primary team and other consultants  Remainder per fellow.
Pt care and plan discussed and reviewed with NP. Plan as outlined above edited by me to reflect our discussion.  Date of service 6/4/21
fu echo  fu ct abd  fu duplex  heme eval

## 2021-06-04 NOTE — CONSULT NOTE ADULT - SUBJECTIVE AND OBJECTIVE BOX
Reason for consult: R/O Thrombophilia    HPI:  Patient is a 46 y/o Male with pmhx of provoked PE 10 yrs ago s/p tibial plateau surgery, DM, had covid last year, presents today c/o chest and L flank/back pain since Tuesday. Pt also mentions he had a fever of 101 yesterday. Took aspirin last night since he could not sleep and pain was constant last night. Has not taken analgesia today. Pt states pain is worse w/ deep inspiration and when he lays down. Pt denies recent chest trauma, cough, n/v, abd pain, dysuria. Denies hx of kidney stones. Pt denies tobacco, alcohol or recreational drug use (03 Jun 2021 16:02)    Hematology/Oncology consulted to see patient with recurrent bilateral pulmonary embolus - Patient had a PE 10-11 years ago s/P surgery. Patient reports he came to ED with mild chest discomfort.  No family history of blood clots.  No recent travel, illnesses or injuries. Patient very active.        PAST MEDICAL & SURGICAL HISTORY:  Diabetic (ICD9 250.00)  diet control    Orthopedic Aftercare (ICD9 V54.9)    Knee Dislocation (ICD9 836.50)        FAMILY HISTORY:      Alochol: Denied  Smoking: Nonsmoker  Drug Use: Denied  Marital Status:         Allergies    No Known Allergies    Intolerances        MEDICATIONS  (STANDING):  apixaban 10 milliGRAM(s) Oral every 12 hours  pantoprazole    Tablet 40 milliGRAM(s) Oral before breakfast  sodium chloride 0.9%. 1000 milliLiter(s) (100 mL/Hr) IV Continuous <Continuous>    MEDICATIONS  (PRN):  oxyCODONE    IR 5 milliGRAM(s) Oral every 4 hours PRN Severe Pain (7 - 10)      ROS  No fever, sweats, chills  No epistaxis, HA, sore throat  No CP, SOB, cough, sputum  Had mild chest discomfort prior to admission  No n/v/d, abd pain, melena, hematochezia  No edema  No rash  No anxiety  No back pain, joint pain  No bleeding, bruising  No dysuria, hematuria    T(C): 37.1 (06-04-21 @ 12:25), Max: 37.7 (06-03-21 @ 20:38)  HR: 82 (06-04-21 @ 12:25) (69 - 85)  BP: 125/74 (06-04-21 @ 12:25) (109/66 - 131/80)  RR: 18 (06-04-21 @ 12:25) (18 - 18)  SpO2: 96% (06-04-21 @ 12:25) (95% - 99%)  Wt(kg): --    PE  NAD  Awake, alert  Anicteric, MMM  RRR  CTAB  Abd soft, NT, ND  No c/c/e  No rash grossly                            14.4   9.09  )-----------( 236      ( 04 Jun 2021 07:05 )             44.1       06-04    140  |  105  |  14  ----------------------------<  121<H>  4.1   |  20<L>  |  1.39<H>    Ca    9.6      04 Jun 2021 07:05    TPro  7.2  /  Alb  4.1  /  TBili  0.4  /  DBili  x   /  AST  31  /  ALT  36  /  AlkPhos  72  06-04      EXAM:  DUPLEX SCAN EXT VEINS LOWER BI                            PROCEDURE DATE:  06/04/2021            INTERPRETATION:  CLINICAL INFORMATION: History of left lower extremity DVT. Pulmonary embolism. Rule out deep vein thrombosis.    COMPARISON: Bilateral lower extremity venous duplex 11/19/2009    TECHNIQUE: Duplex sonography of the BILATERAL LOWER extremity veins with color and spectral Doppler, with and without compression.    FINDINGS:    RIGHT:  Normal compressibility of the RIGHT common femoral, femoral and popliteal veins.  Doppler examination shows normal spontaneous and phasic flow.  No RIGHT calf vein thrombosis is detected.    LEFT:  Normal compressibility of the LEFT common femoral, femoral and popliteal veins.  Doppler examination shows normal spontaneous and phasic flow.  Chronic residual left peroneal vein thrombus. Acute occlusive left soleal vein thrombus.    IMPRESSION:  Acute, below the knee DVT in the left soleal vein.  Chronic residual left peroneal vein scarring.    No evidence of DVT in the visualized venous segments in the right lower extremity.    Findings were discussed with SIGIFREDO Amin, at time of exam.      EXAM:  CT ANGIO CHEST PULCritical access hospital                            PROCEDURE DATE:  06/03/2021            INTERPRETATION:  Reason for Exam: Shortness of breath. chest pain.    CTA of the chest was performed from the thoracic inlet to the level of the adrenal glands following IV contrast injection of  80 cc of Omnipaque 350. No immediate complications were reported.  MIP images were also created and reviewed.    Comparison: November 23, 2009    Tubes/Lines: None    Mediastinum and Heart: Aorta and pulmonary arteries are normal in size. Thyroid gland is unremarkable. No lymphadenopathy. No pericardial effusion.    Lungs, Pleura, and Airways: Pulmonary emboli noted within the bilateral lower lobe segmental and subsegmental vessels with associated minimal groundglass and linear opacities in the periphery suggestive of early infarct. No evidence of right heart strain.    Visualized Abdomen: Unremarkable.    Bones and soft tissues: Unremarkable.    IMPRESSION:    Pulmonary emboli noted within the bilateral lower lobe segmental and subsegmental vessels with associated minimal groundglass and linear opacities in the periphery suggestive of early infarcts. No evidence of right heart strain.    The findings were discussed with Dr. Mclean  at time of final signing.      Patient name: MICA DAI  YOB: 1975   Age: 45 (M)   MR#: [id number]  Study Date: 6/4/2021  [facility] Mini Bautista RDCS  Study quality: Technically fair  Referring Physician: Larry Muniz MD  Blood Pressure: 131/80 mmHg  Height: 183 cm  Weight: 86 kg  BSA: 2.1 m2  ------------------------------------------------------------------------  PROCEDURE: Transthoracic echocardiogram with 2-D, M-Mode  and complete spectral and color flow Doppler.  INDICATION: Dyspnea, unspecified (R06.00)  ------------------------------------------------------------------------  Dimensions:    Normal Values:  LA:     2.9    2.0 - 4.0 cm  Ao:     3.0    2.0 - 3.8 cm  SEPTUM: 0.9    0.6 - 1.2 cm  PWT:    0.8    0.6 - 1.1 cm  LVIDd:  4.4    3.0 - 5.6 cm  LVIDs:  2.3    1.8 - 4.0 cm  Derived variables:  LVMI: 57 g/m2  RWT: 0.36  Fractional short: 48 %  Doppler Peak Velocity (m/sec): AoV=2.0  ------------------------------------------------------------------------  Observations:  Mitral Valve: Normal mitral valve.  Aortic Valve/Aorta: Normal trileaflet aortic valve. Peak  transaortic valve gradient equals 16 mm Hg, mean  transaortic valve gradient equals 10 mm Hg, aortic valve  velocity time integral equals 29 cm, estimated aortic valve  area equals 2.1 sqcm. Peak left ventricular outflow tract  gradient equals 8 mm Hg, mean gradient is equal to 3 mm Hg,  LVOT velocity time integral equals 21 cm.  Aortic Root: 3 cm.  LVOT diameter: 1.9 cm.  Left Atrium: Normal left atrium.  LA volume index = 22  cc/m2.  Left Ventricle: Normal left ventricular systolic function.  No segmental wall motion abnormalities. Normal left  ventricular internal dimensions and wall thicknesses.  Normal diastolic function  Right Heart: Normal right atrium. Normal right ventricular  size and function. Normal tricuspid valve. Normal pulmonic  valve.  Pericardium/Pleura: Normal pericardium with no pericardial  effusion.  Hemodynamic: Estimated right atrial pressure is 8 mm Hg.  Estimated right ventricular systolic pressure equals 20 mm  Hg, assuming right atrial pressure equals 8 mm Hg,  consistent with normal pulmonary pressures.  ------------------------------------------------------------------------  Conclusions:  1. Normal left ventricular internal dimensions and wall  thicknesses.  2. Normal left ventricular systolic function. No segmental  wall motion abnormalities.  3. Normal diastolic function  4. Normal right ventricular size and function.  *** No previous Echo exam.

## 2021-06-05 LAB
ANION GAP SERPL CALC-SCNC: 13 MMOL/L — SIGNIFICANT CHANGE UP (ref 5–17)
BUN SERPL-MCNC: 12 MG/DL — SIGNIFICANT CHANGE UP (ref 7–23)
CALCIUM SERPL-MCNC: 10.1 MG/DL — SIGNIFICANT CHANGE UP (ref 8.4–10.5)
CEA SERPL-MCNC: 2.3 NG/ML — SIGNIFICANT CHANGE UP (ref 0–3.8)
CHLORIDE SERPL-SCNC: 102 MMOL/L — SIGNIFICANT CHANGE UP (ref 96–108)
CO2 SERPL-SCNC: 23 MMOL/L — SIGNIFICANT CHANGE UP (ref 22–31)
CREAT SERPL-MCNC: 1.45 MG/DL — HIGH (ref 0.5–1.3)
GLUCOSE SERPL-MCNC: 108 MG/DL — HIGH (ref 70–99)
HCT VFR BLD CALC: 42.8 % — SIGNIFICANT CHANGE UP (ref 39–50)
HGB BLD-MCNC: 14.5 G/DL — SIGNIFICANT CHANGE UP (ref 13–17)
LDH SERPL L TO P-CCNC: 174 U/L — SIGNIFICANT CHANGE UP (ref 50–242)
MCHC RBC-ENTMCNC: 28.2 PG — SIGNIFICANT CHANGE UP (ref 27–34)
MCHC RBC-ENTMCNC: 33.9 GM/DL — SIGNIFICANT CHANGE UP (ref 32–36)
MCV RBC AUTO: 83.1 FL — SIGNIFICANT CHANGE UP (ref 80–100)
NRBC # BLD: 0 /100 WBCS — SIGNIFICANT CHANGE UP (ref 0–0)
PLATELET # BLD AUTO: 264 K/UL — SIGNIFICANT CHANGE UP (ref 150–400)
POTASSIUM SERPL-MCNC: 4.1 MMOL/L — SIGNIFICANT CHANGE UP (ref 3.5–5.3)
POTASSIUM SERPL-SCNC: 4.1 MMOL/L — SIGNIFICANT CHANGE UP (ref 3.5–5.3)
PSA FLD-MCNC: 0.5 NG/ML — SIGNIFICANT CHANGE UP (ref 0–4)
RBC # BLD: 5.15 M/UL — SIGNIFICANT CHANGE UP (ref 4.2–5.8)
RBC # FLD: 13.7 % — SIGNIFICANT CHANGE UP (ref 10.3–14.5)
SODIUM SERPL-SCNC: 138 MMOL/L — SIGNIFICANT CHANGE UP (ref 135–145)
WBC # BLD: 9.12 K/UL — SIGNIFICANT CHANGE UP (ref 3.8–10.5)
WBC # FLD AUTO: 9.12 K/UL — SIGNIFICANT CHANGE UP (ref 3.8–10.5)

## 2021-06-05 PROCEDURE — 74177 CT ABD & PELVIS W/CONTRAST: CPT | Mod: 26

## 2021-06-05 RX ORDER — SODIUM CHLORIDE 9 MG/ML
1000 INJECTION INTRAMUSCULAR; INTRAVENOUS; SUBCUTANEOUS
Refills: 0 | Status: DISCONTINUED | OUTPATIENT
Start: 2021-06-05 | End: 2021-06-06

## 2021-06-05 RX ADMIN — PANTOPRAZOLE SODIUM 40 MILLIGRAM(S): 20 TABLET, DELAYED RELEASE ORAL at 05:20

## 2021-06-05 RX ADMIN — Medication 650 MILLIGRAM(S): at 18:23

## 2021-06-05 RX ADMIN — APIXABAN 10 MILLIGRAM(S): 2.5 TABLET, FILM COATED ORAL at 21:40

## 2021-06-05 RX ADMIN — APIXABAN 10 MILLIGRAM(S): 2.5 TABLET, FILM COATED ORAL at 10:32

## 2021-06-05 RX ADMIN — SODIUM CHLORIDE 75 MILLILITER(S): 9 INJECTION INTRAMUSCULAR; INTRAVENOUS; SUBCUTANEOUS at 14:13

## 2021-06-06 ENCOUNTER — TRANSCRIPTION ENCOUNTER (OUTPATIENT)
Age: 46
End: 2021-06-06

## 2021-06-06 VITALS
HEART RATE: 79 BPM | RESPIRATION RATE: 18 BRPM | OXYGEN SATURATION: 98 % | TEMPERATURE: 98 F | SYSTOLIC BLOOD PRESSURE: 133 MMHG | DIASTOLIC BLOOD PRESSURE: 77 MMHG

## 2021-06-06 LAB
ANION GAP SERPL CALC-SCNC: 11 MMOL/L — SIGNIFICANT CHANGE UP (ref 5–17)
BUN SERPL-MCNC: 13 MG/DL — SIGNIFICANT CHANGE UP (ref 7–23)
CALCIUM SERPL-MCNC: 9.3 MG/DL — SIGNIFICANT CHANGE UP (ref 8.4–10.5)
CHLORIDE SERPL-SCNC: 105 MMOL/L — SIGNIFICANT CHANGE UP (ref 96–108)
CO2 SERPL-SCNC: 24 MMOL/L — SIGNIFICANT CHANGE UP (ref 22–31)
CREAT SERPL-MCNC: 1.38 MG/DL — HIGH (ref 0.5–1.3)
GLUCOSE SERPL-MCNC: 108 MG/DL — HIGH (ref 70–99)
HCT VFR BLD CALC: 41.8 % — SIGNIFICANT CHANGE UP (ref 39–50)
HGB BLD-MCNC: 13.7 G/DL — SIGNIFICANT CHANGE UP (ref 13–17)
MCHC RBC-ENTMCNC: 27.8 PG — SIGNIFICANT CHANGE UP (ref 27–34)
MCHC RBC-ENTMCNC: 32.8 GM/DL — SIGNIFICANT CHANGE UP (ref 32–36)
MCV RBC AUTO: 85 FL — SIGNIFICANT CHANGE UP (ref 80–100)
NRBC # BLD: 0 /100 WBCS — SIGNIFICANT CHANGE UP (ref 0–0)
PLATELET # BLD AUTO: 279 K/UL — SIGNIFICANT CHANGE UP (ref 150–400)
POTASSIUM SERPL-MCNC: 4.3 MMOL/L — SIGNIFICANT CHANGE UP (ref 3.5–5.3)
POTASSIUM SERPL-SCNC: 4.3 MMOL/L — SIGNIFICANT CHANGE UP (ref 3.5–5.3)
RBC # BLD: 4.92 M/UL — SIGNIFICANT CHANGE UP (ref 4.2–5.8)
RBC # FLD: 13.7 % — SIGNIFICANT CHANGE UP (ref 10.3–14.5)
SODIUM SERPL-SCNC: 140 MMOL/L — SIGNIFICANT CHANGE UP (ref 135–145)
WBC # BLD: 6.76 K/UL — SIGNIFICANT CHANGE UP (ref 3.8–10.5)
WBC # FLD AUTO: 6.76 K/UL — SIGNIFICANT CHANGE UP (ref 3.8–10.5)

## 2021-06-06 PROCEDURE — U0003: CPT

## 2021-06-06 PROCEDURE — 93306 TTE W/DOPPLER COMPLETE: CPT

## 2021-06-06 PROCEDURE — 85027 COMPLETE CBC AUTOMATED: CPT

## 2021-06-06 PROCEDURE — 82378 CARCINOEMBRYONIC ANTIGEN: CPT

## 2021-06-06 PROCEDURE — 85300 ANTITHROMBIN III ACTIVITY: CPT

## 2021-06-06 PROCEDURE — 93970 EXTREMITY STUDY: CPT

## 2021-06-06 PROCEDURE — 84443 ASSAY THYROID STIM HORMONE: CPT

## 2021-06-06 PROCEDURE — 80053 COMPREHEN METABOLIC PANEL: CPT

## 2021-06-06 PROCEDURE — 71046 X-RAY EXAM CHEST 2 VIEWS: CPT

## 2021-06-06 PROCEDURE — 85610 PROTHROMBIN TIME: CPT

## 2021-06-06 PROCEDURE — 86255 FLUORESCENT ANTIBODY SCREEN: CPT

## 2021-06-06 PROCEDURE — 71275 CT ANGIOGRAPHY CHEST: CPT

## 2021-06-06 PROCEDURE — 85025 COMPLETE CBC W/AUTO DIFF WBC: CPT

## 2021-06-06 PROCEDURE — 93308 TTE F-UP OR LMTD: CPT

## 2021-06-06 PROCEDURE — 85303 CLOT INHIBIT PROT C ACTIVITY: CPT

## 2021-06-06 PROCEDURE — 87086 URINE CULTURE/COLONY COUNT: CPT

## 2021-06-06 PROCEDURE — 86769 SARS-COV-2 COVID-19 ANTIBODY: CPT

## 2021-06-06 PROCEDURE — 83036 HEMOGLOBIN GLYCOSYLATED A1C: CPT

## 2021-06-06 PROCEDURE — 81003 URINALYSIS AUTO W/O SCOPE: CPT

## 2021-06-06 PROCEDURE — 85730 THROMBOPLASTIN TIME PARTIAL: CPT

## 2021-06-06 PROCEDURE — 84484 ASSAY OF TROPONIN QUANT: CPT

## 2021-06-06 PROCEDURE — U0005: CPT

## 2021-06-06 PROCEDURE — 83880 ASSAY OF NATRIURETIC PEPTIDE: CPT

## 2021-06-06 PROCEDURE — 80061 LIPID PANEL: CPT

## 2021-06-06 PROCEDURE — 82962 GLUCOSE BLOOD TEST: CPT

## 2021-06-06 PROCEDURE — 85306 CLOT INHIBIT PROT S FREE: CPT

## 2021-06-06 PROCEDURE — 80048 BASIC METABOLIC PNL TOTAL CA: CPT

## 2021-06-06 PROCEDURE — 85379 FIBRIN DEGRADATION QUANT: CPT

## 2021-06-06 PROCEDURE — 83615 LACTATE (LD) (LDH) ENZYME: CPT

## 2021-06-06 PROCEDURE — G0103: CPT

## 2021-06-06 PROCEDURE — 74177 CT ABD & PELVIS W/CONTRAST: CPT

## 2021-06-06 PROCEDURE — 99285 EMERGENCY DEPT VISIT HI MDM: CPT | Mod: 25

## 2021-06-06 RX ORDER — APIXABAN 2.5 MG/1
2 TABLET, FILM COATED ORAL
Qty: 28 | Refills: 0
Start: 2021-06-06 | End: 2021-06-12

## 2021-06-06 RX ADMIN — APIXABAN 10 MILLIGRAM(S): 2.5 TABLET, FILM COATED ORAL at 09:03

## 2021-06-06 RX ADMIN — PANTOPRAZOLE SODIUM 40 MILLIGRAM(S): 20 TABLET, DELAYED RELEASE ORAL at 06:13

## 2021-06-06 NOTE — PROGRESS NOTE ADULT - PROBLEM SELECTOR PLAN 1
B/l lower lobe segmental and subsegmental vessels, L below the knee DVT  -This is his second clot (last one 10 years ago post surgery)  -AC with Eliquis  -No evidence of RV systolic dysfunction   -Hypercoagulable w/u per heme   -Plan for CT A/P to r/o occult malignancy
B/l lower lobe segmental and subsegmental vessels, L below the knee DVT  -This is his second clot (last one 10 years ago post surgery)  -AC with Eliquis  -No evidence of RV systolic dysfunction   -Hypercoagulable w/u per heme   -F/u CT A/P today for r/o malignancy  -Normoxic
acute B/L lower lobe PE  started on full dose eliquis 10 BID followed with 5 BID  CTA chest noted  obtain CT abd/pelv with IV contrast for malignancy W/U   LE DVT study pending read, done   CHeck Echo  pulm eval appreciated   serial CE and EKG  hyper coag W/U per hematology recs   Card eval called   monitor vitlas and hemodynamics  Check O2 sat on ambulation  pain control for pleuritic pain, Oxy Q4hrs PRN
acute B/L lower lobe PE  started on full dose eliquis 10 BID followed with 5 BID  CTA chest noted  obtain CT abd/pelv with IV contrast for malignancy W/U , negative for malignancy   LE DVT study noterd, below knee L side DVT   CHeck Echo noted   pulm eval appreciated   serial CE and EKG  hyper coag W/U per hematology recs   Card eval appreciated   monitor vitlas and hemodynamics  Check O2 sat on ambulation  pain control for pleuritic pain, Oxy Q4hrs PRN    D/C planing with follow up in 1-2 weeks
acute B/L lower lobe PE  started on full dose eliquis 10 BID followed with 5 BID  CTA chest noted  obtain CT abd/pelv with IV contrast for malignancy W/U   LE DVT study noterd, below knee L side DVT   CHeck Echo  pulm eval appreciated   serial CE and EKG  hyper coag W/U per hematology recs   Card eval appreciated   monitor vitlas and hemodynamics  Check O2 sat on ambulation  pain control for pleuritic pain, Oxy Q4hrs PRN

## 2021-06-06 NOTE — DISCHARGE NOTE PROVIDER - CARE PROVIDER_API CALL
Larry Muniz (DO)  Medicine  [address] [facility], [address]  [place] [address]  Phone: [phone]  Fax: [phone]  Follow Up Time:     Gregory Clark)  Hematology; Medical Oncology  [address] [place] [id number]  Phone: [phone]  Fax: [phone]  Follow Up Time:

## 2021-06-06 NOTE — PROGRESS NOTE ADULT - ASSESSMENT
44 y/o M with PMH of provoked PE 10 yrs ago s/p tibial plateau surgery, had COVID last year. Presents today with c/o chest and L flank/back pain since Tuesday. CTA chest with b/l LL segmental and subsegmental PE with early infarcts, L below the knee DVT.  
Patient is a 44 y/o Male with pmhx of provoked PE 10 yrs ago s/p tibial plateau surgery, DM, had covid last year, presents today c/o chest and L flank/back pain since Tuesday. Pt also mentions he had a fever of 101 yesterday. Took aspirin last night since he could not sleep and pain was constant last night. Has not taken analgesia today. Pt states pain is worse w/ deep inspiration and when he lays down. Pt denies recent chest trauma, cough, n/v, abd pain, dysuria. Denies hx of kidney stones. Pt denies tobacco, alcohol or recreational drug use. found to have B/L lower lobe PE 
Patient is a 46 y/o Male with pmhx of provoked PE 10 yrs ago s/p tibial plateau surgery, DM, had covid last year, presents today c/o chest and L flank/back pain since Tuesday. Pt also mentions he had a fever of 101 yesterday. Took aspirin last night since he could not sleep and pain was constant last night. Has not taken analgesia today. Pt states pain is worse w/ deep inspiration and when he lays down. Pt denies recent chest trauma, cough, n/v, abd pain, dysuria. Denies hx of kidney stones. Pt denies tobacco, alcohol or recreational drug use. found to have B/L lower lobe PE 
44 y/o M with PMH of provoked PE 10 yrs ago s/p tibial plateau surgery, had COVID last year. Presents today with c/o chest and L flank/back pain since Tuesday. CTA chest with b/l LL segmental and subsegmental PE with early infarcts, L below the knee DVT.  
Patient is a 44 y/o Male with pmhx of provoked PE 10 yrs ago s/p tibial plateau surgery, DM, had covid last year, presents today c/o chest and L flank/back pain since Tuesday. Pt also mentions he had a fever of 101 yesterday. Took aspirin last night since he could not sleep and pain was constant last night. Has not taken analgesia today. Pt states pain is worse w/ deep inspiration and when he lays down. Pt denies recent chest trauma, cough, n/v, abd pain, dysuria. Denies hx of kidney stones. Pt denies tobacco, alcohol or recreational drug use. found to have B/L lower lobe PE

## 2021-06-06 NOTE — PROGRESS NOTE ADULT - PROBLEM SELECTOR PROBLEM 3
Chronic kidney disease
Chronic kidney disease
MARYCHUY (acute kidney injury)

## 2021-06-06 NOTE — PROGRESS NOTE ADULT - PROBLEM SELECTOR PLAN 3
-per renal
states that had a hx of elevated BUN/Cr after his covid infection and underwent kidney biopsy with no acute findings   IV hydration  S/P CTA chest, hold further contrast study for now till repeat blood work  oral hydration  Renal eval called   avoid nephrotoxic medications  BUN/Cr trending down with IV hydration
states that had a hx of elevated BUN/Cr after his covid infection and underwent kidney biopsy with no acute findings   IV hydration  S/P CTA chest, hold further contrast study for now till repeat blood work  oral hydration  Renal eval called   avoid nephrotoxic medications  BUN/Cr trending down with IV hydration
states that had a hx of elevated BUN/Cr after his covid infection and underwent kidney biopsy with no acute findings   IV hydration  oral hydration  Renal eval called   avoid nephrotoxic medications  BUN/Cr trending down with IV hydration

## 2021-06-06 NOTE — PROGRESS NOTE ADULT - SUBJECTIVE AND OBJECTIVE BOX
DATE OF SERVICE: 06-05-21      Patient is a 45y old  Male who presents with a chief complaint of PE (05 Jun 2021 19:39)      INTERVAL HISTORY: feels better, chest pain improved but still there    REVIEW OF SYSTEMS:  CONSTITUTIONAL: No weakness  EYES/ENT: No visual changes;  No throat pain   NECK: No pain or stiffness  RESPIRATORY: No cough, wheezing; No shortness of breath  CARDIOVASCULAR: + chest pain, no palpitations  GASTROINTESTINAL: No abdominal  pain. No nausea, vomiting, or hematemesis  GENITOURINARY: No dysuria, frequency or hematuria  NEUROLOGICAL: No stroke like symptoms  SKIN: No rashes          PHYSICAL EXAM:  T(C): 36.5 (06-05-21 @ 21:58), Max: 37.4 (06-05-21 @ 12:13)  HR: 71 (06-05-21 @ 21:58) (71 - 86)  BP: 105/61 (06-05-21 @ 21:58) (105/61 - 134/80)  RR: 18 (06-05-21 @ 21:58) (18 - 18)  SpO2: 97% (06-05-21 @ 21:58) (95% - 97%)  Wt(kg): --  I&O's Summary    04 Jun 2021 07:01  -  05 Jun 2021 07:00  --------------------------------------------------------  IN: 480 mL / OUT: 0 mL / NET: 480 mL    05 Jun 2021 07:01  -  06 Jun 2021 00:24  --------------------------------------------------------  IN: 240 mL / OUT: 0 mL / NET: 240 mL          Appearance: In no distress	  HEENT:    PERRL, EOMI	  Cardiovascular:  S1 S2, No JVD  Respiratory: Lungs clear to auscultation	  Gastrointestinal:  Soft, Non-tender, + BS	  Vascularature:  No edema of LE  Psychiatric: Appropriate affect   Neuro: no acute focal deficits                               14.5   9.12  )-----------( 264      ( 05 Jun 2021 07:02 )             42.8     06-05    138  |  102  |  12  ----------------------------<  108<H>  4.1   |  23  |  1.45<H>    Ca    10.1      05 Jun 2021 07:02    TPro  7.2  /  Alb  4.1  /  TBili  0.4  /  DBili  x   /  AST  31  /  ALT  36  /  AlkPhos  72  06-04        Labs personally reviewed      < from: Transthoracic Echocardiogram (06.04.21 @ 13:15) >  Conclusions:  1. Normal left ventricular internal dimensions and wall  thicknesses.  2. Normal left ventricular systolic function. No segmental  wall motion abnormalities.  3. Normal diastolic function  4. Normal right ventricular size and function.  *** No previous Echo exam.         Assessment /Plan:   Patient is a 46 y/o Male with pmhx of provoked PE 10 yrs ago s/p tibial plateau surgery, DM, had covid last year, presents today c/o chest and L flank/back pain since Tuesday. Pt also mentions he had a fever of 101 yesterday. Took aspirin last night since he could not sleep and pain was constant last night. Has not taken analgesia today. Pt states pain is worse w/ deep inspiration and when he lays down. Pt denies recent chest trauma, cough, n/v, abd pain, dysuria. Denies hx of kidney stones. Pt denies tobacco, alcohol or recreational drug use. found to have B/L lower lobe PE       Problem/Plan - 1:  .·  Problem: Chest pain.  Plan: likely due to acute PE, worsens with inspiration, more pleuritic in nature.  CE- negative  TTE normal  EKG nonischemic   Improving with treatment for PE       Problem/Plan - 2:  ·  Problem: Pulmonary embolism.  Plan: acute B/L lower lobe PE  full dose eliquis 10 BID followed with 5 BID  obtain CT abd/pelv with IV contrast for malignancy w/u  LE DVT study + for below the knee DVT  TTE no right heart strain  Hypercoag work up as per heme    Problem/Plan - 3:  ·  Problem: MARYCHUY (acute kidney injury).  Plan: states that had a hx of elevated BUN/Cr after his covid infection and underwent kidney biopsy with no acute findings   avoid nephrotoxic medications.s/p IVF   continue to monitor     Problem/Plan - 4:  ·  Problem: Diabetes.  Plan: Sliding scale  prediabetes  consistent carb    Problem/Plan - 5:  ·  Problem: Prophylactic measure.  Plan: DVT and gI PPX       Advanced care planning/advanced directives discussed with patient/family. DNR status including forceful chest compressions to attempt to restart the heart, ventilator support/artificial breathing, electric shock, artificial nutrition, health care proxy, Molst form all discussed with pt. Pt wishes to consider.  More than fifteen minutes spent on discussing advanced directives. OMT on six regions for acute somatic dysfunctions done at the bedside.  Sixty five minutes spent on total encounter, of which more than fifty percent of the encounter was spent on counseling and/or coordinating care by the attending physician.        Susu HINOJOSAC  Danilo Covington DO [facility]  Cardiovascular Medicine  [address] [place] [address], [address]  Office [phone]  Cell [phone]        Danilo Covington DO [facility]  Cardiovascular Medicine  [address] [place] [address], [address]  Office: [phone]  Cell: [phone]
Follow-up Pulm Progress Note    No new respiratory events overnight.  Remains with mild pleuritic chest pain, improving as per patient  Breathing comfortably on room air  Denies CP, SOB    Medications:  MEDICATIONS  (STANDING):  apixaban 10 milliGRAM(s) Oral every 12 hours  pantoprazole    Tablet 40 milliGRAM(s) Oral before breakfast  sodium chloride 0.9%. 1000 milliLiter(s) (100 mL/Hr) IV Continuous <Continuous>    MEDICATIONS  (PRN):  acetaminophen   Tablet .. 650 milliGRAM(s) Oral every 6 hours PRN Mild Pain (1 - 3)  oxyCODONE    IR 5 milliGRAM(s) Oral every 4 hours PRN Severe Pain (7 - 10)    Vital Signs Last 24 Hrs  T(C): 37.2 (05 Jun 2021 04:05), Max: 37.2 (05 Jun 2021 04:05)  T(F): 98.9 (05 Jun 2021 04:05), Max: 98.9 (05 Jun 2021 04:05)  HR: 83 (05 Jun 2021 04:05) (82 - 84)  BP: 134/80 (05 Jun 2021 04:05) (125/72 - 134/80)  BP(mean): --  RR: 18 (05 Jun 2021 04:05) (18 - 18)  SpO2: 95% (05 Jun 2021 04:05) (95% - 96%)    06-04 @ 07:01  -  06-05 @ 07:00  --------------------------------------------------------  IN: 480 mL / OUT: 0 mL / NET: 480 mL    LABS:                        14.5   9.12  )-----------( 264      ( 05 Jun 2021 07:02 )             42.8     06-05    138  |  102  |  12  ----------------------------<  108<H>  4.1   |  23  |  1.45<H>    Ca    10.1      05 Jun 2021 07:02    TPro  7.2  /  Alb  4.1  /  TBili  0.4  /  DBili  x   /  AST  31  /  ALT  36  /  AlkPhos  72  06-04      CAPILLARY BLOOD GLUCOSE  POCT Blood Glucose.: 107mg/dL (05 Jun 2021 08:20)    PT/INR - ( 03 Jun 2021 14:36 )   PT: 12.4 sec;   INR: 1.04 ratio    PTT - ( 03 Jun 2021 14:36 )  PTT:30.6 sec      Serum Pro-Brain Natriuretic Peptide: 5 pg/mL (06-03-21 @ 09:54)    CULTURES: (if applicable)  Culture - Urine (collected 06-03-21 @ 16:27)  Source: .Urine Clean Catch (Midstream)  Final Report (06-04-21 @ 12:21):    <10,000 CFU/mL Normal Urogenital Osiris    Physical Examination:  PULM: CTA bilaterally   CVS: RRR    RADIOLOGY REVIEWED  CT chest: < from: CT Angio Chest PE Protocol w/ IV Cont (06.03.21 @ 13:41) >  INTERPRETATION:  Reason for Exam: Shortness of breath. chest pain.    CTA of the chest was performed from the thoracic inlet to the level of the adrenal glands following IV contrast injection of  80 cc of Omnipaque 350. No immediate complications were reported.  MIP images were also created and reviewed.    Comparison: November 23, 2009    Tubes/Lines: None    Mediastinum and Heart: Aorta and pulmonary arteries are normal in size. Thyroid gland is unremarkable. No lymphadenopathy. No pericardial effusion.    Lungs, Pleura, and Airways: Pulmonary emboli noted within the bilateral lower lobe segmental and subsegmental vessels with associatedminimal groundglass and linear opacities in the periphery suggestive of early infarct. No evidence of right heart strain.    Visualized Abdomen: Unremarkable.    Bones and soft tissues: Unremarkable.    IMPRESSION:    Pulmonary emboli noted within the bilateral lower lobe segmental and subsegmental vessels with associated minimal groundglass and linear opacities in the periphery suggestive of early infarcts. No evidence of right heart strain.    The findings were discussed with Dr. Mclena  at time of final signing.      < end of copied text >    d< from: [facility] Duplex Lower Ext Vein Scan, Bilat (06.04.21 @ 11:08) >  FINDINGS:    RIGHT:  Normal compressibility of the RIGHT common femoral, femoral and popliteal veins.  Doppler examination shows normal spontaneous and phasic flow.  No RIGHT calf vein thrombosis is detected.    LEFT:  Normal compressibility of the LEFT common femoral, femoral and popliteal veins.  Doppler examination shows normal spontaneous and phasic flow.  Chronic residual left peroneal vein thrombus. Acute occlusive left soleal vein thrombus.    IMPRESSION:  Acute, below the knee DVT in the left soleal vein.  Chronic residual left peroneal vein scarring.    No evidence of DVT in the visualized venous segments in the right lower extremity.    Findings were discussed with SIGIFREDO Amin, at time of exam.        < end of copied text >      TTE: < from: Transthoracic Echocardiogram (06.04.21 @ 13:15) >  PROCEDURE: Transthoracic echocardiogram with 2-D, M-Mode  and complete spectral and color flow Doppler.  INDICATION: Dyspnea, unspecified (R06.00)  ------------------------------------------------------------------------  Dimensions:    Normal Values:  LA:     2.9    2.0 - 4.0 cm  Ao:     3.0    2.0 - 3.8 cm  SEPTUM: 0.9    0.6 - 1.2 cm  PWT:    0.8    0.6 - 1.1 cm  LVIDd:  4.4    3.0 - 5.6 cm  LVIDs:  2.3    1.8 - 4.0 cm  Derived variables:  LVMI: 57 g/m2  RWT: 0.36  Fractional short: 48 %  Doppler Peak Velocity (m/sec): AoV=2.0  ------------------------------------------------------------------------  Observations:  Mitral Valve: Normal mitral valve.  Aortic Valve/Aorta: Normal trileaflet aortic valve. Peak  transaortic valve gradient equals 16 mm Hg, mean  transaortic valve gradient equals 10 mm Hg, aortic valve  velocity time integral equals 29 cm, estimated aortic valve  area equals 2.1 sqcm. Peak left ventricular outflow tract  gradient equals 8 mm Hg, mean gradient is equal to 3 mm Hg,  LVOT velocity time integral equals 21 cm.  Aortic Root: 3 cm.  LVOT diameter: 1.9 cm.  Left Atrium: Normal left atrium.  LA volume index = 22  cc/m2.  Left Ventricle: Normal left ventricular systolic function.  No segmental wall motion abnormalities. Normal left  ventricular internal dimensions and wall thicknesses.  Normal diastolic function  Right Heart: Normal right atrium. Normal right ventricular  size and function. Normal tricuspid valve. Normal pulmonic  valve.  Pericardium/Pleura: Normal pericardium with no pericardial  effusion.  Hemodynamic: Estimated right atrial pressure is 8 mm Hg.  Estimatedright ventricular systolic pressure equals 20 mm  Hg, assuming right atrial pressure equals 8 mm Hg,  consistent with normal pulmonary pressures.  ------------------------------------------------------------------------  Conclusions:  1. Normal left ventricular internal dimensions and wall  thicknesses.  2. Normal left ventricular systolic function. No segmental  wall motion abnormalities.  3. Normal diastolic function  4. Normal right ventricular size and function.  *** No previous Echo exam.    < end of copied text >    
DATE OF SERVICE: 06-06-21 @ 21:32    Patient is a 45y old  Male who presents with a chief complaint of PE (06 Jun 2021 11:46)      INTERVAL HISTORY: feels ok    REVIEW OF SYSTEMS:  CONSTITUTIONAL: No weakness  EYES/ENT: No visual changes;  No throat pain   NECK: No pain or stiffness  RESPIRATORY: No cough, wheezing; No shortness of breath  CARDIOVASCULAR: No chest pain or palpitations  GASTROINTESTINAL: No abdominal  pain. No nausea, vomiting, or hematemesis  GENITOURINARY: No dysuria, frequency or hematuria  NEUROLOGICAL: No stroke like symptoms  SKIN: No rashes           PHYSICAL EXAM:  T(C): 36.4 (06-06-21 @ 13:03), Max: 36.5 (06-05-21 @ 21:58)  HR: 79 (06-06-21 @ 13:03) (71 - 79)  BP: 133/77 (06-06-21 @ 13:03) (105/61 - 133/77)  RR: 18 (06-06-21 @ 13:03) (18 - 18)  SpO2: 98% (06-06-21 @ 13:03) (96% - 98%)  Wt(kg): --  I&O's Summary    05 Jun 2021 07:01  -  06 Jun 2021 07:00  --------------------------------------------------------  IN: 1140 mL / OUT: 825 mL / NET: 315 mL    06 Jun 2021 07:01  -  06 Jun 2021 21:32  --------------------------------------------------------  IN: 375 mL / OUT: 550 mL / NET: -175 mL          Appearance: In no distress	  HEENT:    PERRL, EOMI	  Cardiovascular:  S1 S2, No JVD  Respiratory: Lungs clear to auscultation	  Gastrointestinal:  Soft, Non-tender, + BS	  Vascularature:  No edema of LE  Psychiatric: Appropriate affect   Neuro: no acute focal deficits                               13.7   6.76  )-----------( 279      ( 06 Jun 2021 06:46 )             41.8     06-06    140  |  105  |  13  ----------------------------<  108<H>  4.3   |  24  |  1.38<H>    Ca    9.3      06 Jun 2021 06:46          Labs personally reviewed       from: Transthoracic Echocardiogram (06.04.21 @ 13:15) >  Conclusions:  1. Normal left ventricular internal dimensions and wall  thicknesses.  2. Normal left ventricular systolic function. No segmental  wall motion abnormalities.  3. Normal diastolic function  4. Normal right ventricular size and function.  *** No previous Echo exam.         Assessment /Plan:   Patient is a 46 y/o Male with pmhx of provoked PE 10 yrs ago s/p tibial plateau surgery, DM, had covid last year, presents today c/o chest and L flank/back pain since Tuesday. Pt also mentions he had a fever of 101 yesterday. Took aspirin last night since he could not sleep and pain was constant last night. Has not taken analgesia today. Pt states pain is worse w/ deep inspiration and when he lays down. Pt denies recent chest trauma, cough, n/v, abd pain, dysuria. Denies hx of kidney stones. Pt denies tobacco, alcohol or recreational drug use. found to have B/L lower lobe PE       Problem/Plan - 1:  .·  Problem: Chest pain.  Plan: likely due to acute PE, worsens with inspiration, more pleuritic in nature.  CE- negative  TTE normal  EKG nonischemic   Improving with treatment for PE       Problem/Plan - 2:  ·  Problem: Pulmonary embolism.  Plan: acute B/L lower lobe PE  full dose eliquis 10 BID followed with 5 BID  CT abd/pelv with IV contrast  with no occult malignancy noted   LE DVT study + for below the knee DVT  TTE no right heart strain  Hypercoag work up as per heme    Problem/Plan - 3:  ·  Problem: MARYCHUY (acute kidney injury).  Plan: states that had a hx of elevated BUN/Cr after his covid infection and underwent kidney biopsy with no acute findings   avoid nephrotoxic medications. s/p IVF   Improving, nearly normalized    Problem/Plan - 4:  ·  Problem: Diabetes.  Plan: Sliding scale  prediabetes  consistent carb    Problem/Plan - 5:  ·  Problem: Prophylactic measure.  Plan: DVT and gI PPX        I had a prolonged conversation with the patient regarding hospital course, differential diagnosis and results of diagnostic tests thus far.  Plan of care discussed with patient after the evaluation. Patient expresses clear understanding and satisfaction with the plan of care. Sixty five minutes spent on total encounter, of which more than fifty percent of the encounter was spent on counseling and/or coordinating care by the attending physician.      Danilo Covington DO [facility]  Cardiovascular Medicine  [address] [place] [address], [address]  Office: [phone]  Cell: [phone]
Name of Patient : MICA DAI  MRN: [id number]  DATE OF SERVICE: 06-05-21 @ 19:39    Subjective: Patient seen and examined. No new events except as noted.   doing okay  pleuritic pain better     REVIEW OF SYSTEMS:    CONSTITUTIONAL: No weakness, fevers or chills  EYES/ENT: No visual changes;  No vertigo or throat pain   NECK: No pain or stiffness  RESPIRATORY: pain with deep inspiration   CARDIOVASCULAR: No chest pain or palpitations  GASTROINTESTINAL: No abdominal or epigastric pain.   GENITOURINARY: No dysuria, frequency or hematuria  NEUROLOGICAL: No numbness or weakness  SKIN: No itching, burning, rashes, or lesions   All other review of systems is negative unless indicated above.    MEDICATIONS:  MEDICATIONS  (STANDING):  apixaban 10 milliGRAM(s) Oral every 12 hours  pantoprazole    Tablet 40 milliGRAM(s) Oral before breakfast  sodium chloride 0.9%. 1000 milliLiter(s) (75 mL/Hr) IV Continuous <Continuous>      PHYSICAL EXAM:  T(C): 37.4 (06-05-21 @ 12:13), Max: 37.4 (06-05-21 @ 12:13)  HR: 86 (06-05-21 @ 12:13) (83 - 86)  BP: 121/79 (06-05-21 @ 12:13) (121/79 - 134/80)  RR: 18 (06-05-21 @ 12:13) (18 - 18)  SpO2: 95% (06-05-21 @ 12:13) (95% - 95%)  Wt(kg): --  I&O's Summary    04 Jun 2021 07:01  -  05 Jun 2021 07:00  --------------------------------------------------------  IN: 480 mL / OUT: 0 mL / NET: 480 mL    05 Jun 2021 07:01  -  05 Jun 2021 19:39  --------------------------------------------------------  IN: 240 mL / OUT: 0 mL / NET: 240 mL          Appearance: Normal	  HEENT:  PERRLA   Lymphatic: No lymphadenopathy   Cardiovascular: Normal S1 S2, no JVD  Respiratory: normal effort , clear  Gastrointestinal:  Soft, Non-tender  Skin: No rashes,  warm to touch  Psychiatry:  Mood & affect appropriate  Musculuskeletal: No edema      All labs, Imaging and EKGs personally reviewed       06-04-21 @ 07:01  -  06-05-21 @ 07:00  --------------------------------------------------------  IN: 480 mL / OUT: 0 mL / NET: 480 mL    06-05-21 @ 07:01  -  06-05-21 @ 19:39  --------------------------------------------------------  IN: 240 mL / OUT: 0 mL / NET: 240 mL                            14.5   9.12  )-----------( 264      ( 05 Jun 2021 07:02 )             42.8               06-05    138  |  102  |  12  ----------------------------<  108<H>  4.1   |  23  |  1.45<H>    Ca    10.1      05 Jun 2021 07:02    TPro  7.2  /  Alb  4.1  /  TBili  0.4  /  DBili  x   /  AST  31  /  ALT  36  /  AlkPhos  72  06-04                     < from: [facility] Duplex Lower Ext Vein Scan, Bilat (06.04.21 @ 11:08) >  IMPRESSION:  Acute, below the knee DVT in the left soleal vein.  Chronic residual left peroneal vein scarring.    No evidence of DVT in the visualized venous segments in the right lower extremity.    Findings were discussed with SIGIFREDO Amin, at time of exam.                
Follow-up Pulm Progress Note    No new respiratory events overnight.  Denies SOB/CP.   95% on RA  c/o R pleuritic CP    Medications:  MEDICATIONS  (STANDING):  apixaban 10 milliGRAM(s) Oral every 12 hours  pantoprazole    Tablet 40 milliGRAM(s) Oral before breakfast  sodium chloride 0.9%. 1000 milliLiter(s) (100 mL/Hr) IV Continuous <Continuous>    MEDICATIONS  (PRN):  oxyCODONE    IR 5 milliGRAM(s) Oral every 4 hours PRN Severe Pain (7 - 10)          Vital Signs Last 24 Hrs  T(C): 37.1 (2021 12:25), Max: 37.7 (2021 20:38)  T(F): 98.7 (2021 12:25), Max: 99.8 (2021 20:38)  HR: 82 (2021 12:25) (70 - 85)  BP: 125/74 (2021 12:25) (109/66 - 131/80)  BP(mean): --  RR: 18 (2021 12:25) (18 - 18)  SpO2: 96% (2021 12:25) (95% - 99%) on RA                LABS:                        14.4   9.09  )-----------( 236      ( 2021 07:05 )             44.1     06-04    140  |  105  |  14  ----------------------------<  121<H>  4.1   |  20<L>  |  1.39<H>    Ca    9.6      2021 07:05    TPro  7.2  /  Alb  4.1  /  TBili  0.4  /  DBili  x   /  AST  31  /  ALT  36  /  AlkPhos  72  06-04          CAPILLARY BLOOD GLUCOSE      POCT Blood Glucose.: 99 mg/dL (2021 12:13)    PT/INR - ( 2021 14:36 )   PT: 12.4 sec;   INR: 1.04 ratio         PTT - ( 2021 14:36 )  PTT:30.6 sec  Urinalysis Basic - ( 2021 11:45 )    Color: Light Yellow / Appearance: Clear / S.016 / pH: x  Gluc: x / Ketone: Negative  / Bili: Negative / Urobili: Negative   Blood: x / Protein: Negative / Nitrite: Negative   Leuk Esterase: Negative / RBC: x / WBC x   Sq Epi: x / Non Sq Epi: x / Bacteria: x        Serum Pro-Brain Natriuretic Peptide: 5 pg/mL (21 @ 09:54)                CULTURES: (if applicable)  Culture Results:   <10,000 CFU/mL Normal Urogenital Osiris ( @ 16:27)    Most recent blood culture --  @ 16:27   -- -- .Urine Clean Catch (Midstream)  @ 16:27        Physical Examination:  PULM: Clear to auscultation bilaterally, no significant sputum production  CVS: S1, S2 heard    RADIOLOGY REVIEWED  CTA chest: < from: CT Angio Chest PE Protocol w/ IV Cont (21 @ 13:41) >  Mediastinum and Heart: Aorta and pulmonary arteries are normal in size. Thyroid gland is unremarkable. No lymphadenopathy. No pericardial effusion.    Lungs, Pleura, and Airways: Pulmonary emboli noted within the bilateral lower lobe segmental and subsegmental vessels with associatedminimal groundglass and linear opacities in the periphery suggestive of early infarct. No evidence of right heart strain.    Visualized Abdomen: Unremarkable.    Bones and soft tissues: Unremarkable.    IMPRESSION:    Pulmonary emboli noted within the bilateral lower lobe segmental and subsegmental vessels with associated minimal groundglass and linear opacities in the periphery suggestive of early infarcts. No evidence of right heart strain.    The findings were discussed with Dr. Mclean  at time of final signing.    < end of copied text >    
Name of Patient : MICA DAI  MRN: [id number]  DATE OF SERVICE: 21 @ 11:56    Subjective: Patient seen and examined. No new events except as noted.   Cont to have     REVIEW OF SYSTEMS:    CONSTITUTIONAL: No weakness, fevers or chills  EYES/ENT: No visual changes;  No vertigo or throat pain   NECK: No pain or stiffness  RESPIRATORY: No cough, wheezing, hemoptysis; No shortness of breath  CARDIOVASCULAR: No chest pain or palpitations  GASTROINTESTINAL: No abdominal or epigastric pain. No nausea, vomiting, or hematemesis; No diarrhea or constipation. No melena or hematochezia.  GENITOURINARY: No dysuria, frequency or hematuria  NEUROLOGICAL: No numbness or weakness  SKIN: No itching, burning, rashes, or lesions   All other review of systems is negative unless indicated above.    MEDICATIONS:  MEDICATIONS  (STANDING):  apixaban 10 milliGRAM(s) Oral every 12 hours  pantoprazole    Tablet 40 milliGRAM(s) Oral before breakfast  sodium chloride 0.9%. 1000 milliLiter(s) (100 mL/Hr) IV Continuous <Continuous>      PHYSICAL EXAM:  T(C): 37.6 (21 @ 05:01), Max: 37.7 (21 @ 20:38)  HR: 81 (21 @ 05:01) (69 - 85)  BP: 131/80 (21 @ 05:01) (109/66 - 131/80)  RR: 18 (21 @ 05:01) (18 - 18)  SpO2: 95% (21 @ 05:01) (95% - 99%)  Wt(kg): --  I&O's Summary    2021 07:01  -  2021 11:56  --------------------------------------------------------  IN: 240 mL / OUT: 0 mL / NET: 240 mL        Weight (kg): 86.2 ( @ 21:33)    Appearance: Normal	  HEENT:  PERRLA   Lymphatic: No lymphadenopathy   Cardiovascular: Normal S1 S2, no JVD  Respiratory: normal effort , clear  Gastrointestinal:  Soft, Non-tender  Skin: No rashes,  warm to touch  Psychiatry:  Mood & affect appropriate  Musculuskeletal: No edema      All labs, Imaging and EKGs personally reviewed     21 @ 07:01  -  21 @ 11:56  --------------------------------------------------------  IN: 240 mL / OUT: 0 mL / NET: 240 mL                            14.4   9.09  )-----------( 236      ( 2021 07:05 )             44.1               -    140  |  105  |  14  ----------------------------<  121<H>  4.1   |  20<L>  |  1.39<H>    Ca    9.6      2021 07:05    TPro  7.2  /  Alb  4.1  /  TBili  0.4  /  DBili  x   /  AST  31  /  ALT  36  /  AlkPhos  72  06-04    PT/INR - ( 2021 14:36 )   PT: 12.4 sec;   INR: 1.04 ratio         PTT - ( 2021 14:36 )  PTT:30.6 sec                   Urinalysis Basic - ( 2021 11:45 )    Color: Light Yellow / Appearance: Clear / S.016 / pH: x  Gluc: x / Ketone: Negative  / Bili: Negative / Urobili: Negative   Blood: x / Protein: Negative / Nitrite: Negative   Leuk Esterase: Negative / RBC: x / WBC x   Sq Epi: x / Non Sq Epi: x / Bacteria: x            
Name of Patient : MICA DAI  MRN: [id number]  DATE OF SERVICE: 06-06-21 @ 10:39    Subjective: Patient seen and examined. No new events except as noted.   doing okay  pain controlled  saturating well  D/C planing with outpatient follow up     REVIEW OF SYSTEMS:    CONSTITUTIONAL: No weakness, fevers or chills  EYES/ENT: No visual changes;  No vertigo or throat pain   NECK: No pain or stiffness  RESPIRATORY: No cough, wheezing, hemoptysis; No shortness of breath  CARDIOVASCULAR: No chest pain or palpitations  GASTROINTESTINAL: No abdominal or epigastric pain. No nausea, vomiting, or hematemesis; No diarrhea or constipation. No melena or hematochezia.  GENITOURINARY: No dysuria, frequency or hematuria  NEUROLOGICAL: No numbness or weakness  SKIN: No itching, burning, rashes, or lesions   All other review of systems is negative unless indicated above.    MEDICATIONS:  MEDICATIONS  (STANDING):  apixaban 10 milliGRAM(s) Oral every 12 hours  pantoprazole    Tablet 40 milliGRAM(s) Oral before breakfast  sodium chloride 0.9%. 1000 milliLiter(s) (75 mL/Hr) IV Continuous <Continuous>      PHYSICAL EXAM:  T(C): 36.4 (06-06-21 @ 04:02), Max: 37.4 (06-05-21 @ 12:13)  HR: 77 (06-06-21 @ 04:02) (71 - 86)  BP: 131/68 (06-06-21 @ 04:02) (105/61 - 131/68)  RR: 18 (06-06-21 @ 04:02) (18 - 18)  SpO2: 96% (06-06-21 @ 04:02) (95% - 97%)  Wt(kg): --  I&O's Summary    05 Jun 2021 07:01  -  06 Jun 2021 07:00  --------------------------------------------------------  IN: 1140 mL / OUT: 825 mL / NET: 315 mL          Appearance: Normal	  HEENT:  PERRLA   Lymphatic: No lymphadenopathy   Cardiovascular: Normal S1 S2, no JVD  Respiratory: normal effort , clear  Gastrointestinal:  Soft, Non-tender  Skin: No rashes,  warm to touch  Psychiatry:  Mood & affect appropriate  Musculuskeletal: No edema      All labs, Imaging and EKGs personally reviewed       06-05-21 @ 07:01  -  06-06-21 @ 07:00  --------------------------------------------------------  IN: 1140 mL / OUT: 825 mL / NET: 315 mL                          13.7   6.76  )-----------( 279      ( 06 Jun 2021 06:46 )             41.8               06-06    140  |  105  |  13  ----------------------------<  108<H>  4.3   |  24  |  1.38<H>    Ca    9.3      06 Jun 2021 06:46                         < from: CT Abdomen and Pelvis w/ IV Cont (06.05.21 @ 17:53) >    PROCEDURE:  CT of the Abdomen and Pelvis was performed.  Sagittal and coronal reformats were performed.    FINDINGS:  LOWERCHEST: Small left pleural effusion, increased.    LIVER: Subcentimeter left hepatic lobe hypodensity too small to characterize.  BILE DUCTS: Normal caliber.  GALLBLADDER: Within normal limits.  SPLEEN: Within normal limits.  PANCREAS: Within normal limits.  ADRENALS: Within normal limits.  KIDNEYS/URETERS: No hydronephrosis. Right renal cyst.    BLADDER: Minimally distended.  REPRODUCTIVE ORGANS: Prostate within normal limits.    BOWEL: No bowel obstruction.  PERITONEUM: Trace pelvic fluid.  VESSELS: Within normal limits.  RETROPERITONEUM/LYMPH NODES: No lymphadenopathy.  ABDOMINAL WALL: Within normal limits.  BONES: Within normal limits.    IMPRESSION:  No CT evidence of intra-abdominal malignancy.

## 2021-06-06 NOTE — DISCHARGE NOTE PROVIDER - NSDCMRMEDTOKEN_GEN_ALL_CORE_FT
apixaban 5 mg oral tablet: 2 tab(s) orally every 12 hours  apixaban 5 mg oral tablet: 1 tab(s) orally every 12 hours   return to work: to whom it may concern,  Mr. Grayson is cleared to return to work on Monday 6/14/21

## 2021-06-06 NOTE — DISCHARGE NOTE PROVIDER - NSDCCPCAREPLAN_GEN_ALL_CORE_FT
PRINCIPAL DISCHARGE DIAGNOSIS  Diagnosis: Pulmonary embolism  Assessment and Plan of Treatment: Take your anticoagulation (lovenox, coumadin) as directed.  Follow up with your health care provider within one week. Call for appointment.  If you develop shortness of breath or if your shortness of breath worsens call your Health Care Provider or go to the Emergency Department.

## 2021-06-06 NOTE — PROGRESS NOTE ADULT - PROBLEM SELECTOR PLAN 2
likely due to acute PE  serial CE and EKG  Echo ordered  card glen called   monitor vitals  pain control
likely due to acute PE  serial CE and EKG  Echo ordered noted, no acute pathology   card eval appreciated   monitor vitals  pain control
likely due to acute PE  serial CE and EKG  Echo ordered  card glen called   monitor vitals  pain control
Groundglass and linear opacities in the periphery suggestive of early infarcts  -Cause of pleuritic pain - improving per patient  -Pain control
groundglass and linear opacities in the periphery suggestive of early infarcts  -Cause of pleuritic pain  -Pain control.

## 2021-06-06 NOTE — PROGRESS NOTE ADULT - PROBLEM SELECTOR PLAN 4
prediabetes prior, however now normal A1C level   not on any medications  CHeck A1C level 5.7  diet control

## 2021-06-06 NOTE — DISCHARGE NOTE PROVIDER - NSDCFUADDINST_GEN_ALL_CORE_FT
follow up with Dr. Muniz in his office on 6/17/2021 at 4 pm  follow up with Dr. Clark within 1-2 weeks after discharge

## 2021-06-06 NOTE — DISCHARGE NOTE NURSING/CASE MANAGEMENT/SOCIAL WORK - PATIENT PORTAL LINK FT
You can access the FollowMyHealth Patient Portal offered by [facility] by registering at the following website: [email or web address]. By joining [email or web address]’s FollowMyHealth portal, you will also be able to view your health information using other applications (apps) compatible with our system.

## 2021-06-06 NOTE — DISCHARGE NOTE PROVIDER - HOSPITAL COURSE
Patient is a 46 y/o Male with pmhx of provoked PE 10 yrs ago s/p tibial plateau surgery, DM, had covid last year, presents today c/o chest and L flank/back pain since Tuesday. Pt also mentions he had a fever of 101 yesterday. Took aspirin last night since he could not sleep and pain was constant last night. Has not taken analgesia today. Pt states pain is worse w/ deep inspiration and when he lays down. Pt denies recent chest trauma, cough, n/v, abd pain, dysuria. Denies hx of kidney stones. Pt denies tobacco, alcohol or recreational drug use. found to have B/L lower lobe PE   ·  Problem: Chest pain.  Plan: likely due to acute PE  serial CE and EKG  Echo ordered noted, no acute pathology    Problem: MARYCHUY (acute kidney injury).  Plan: states that had a hx of elevated BUN/Cr after his covid infection and underwent kidney biopsy with no acute findings   IV hydration  oral hydration  Renal eval called   avoid nephrotoxic medications  Need to rule out malignancy  --Tumor markers paraneoplastic panel ordered  --Have ordered lupus profile, Protein C, Protein S, AT3  --CT C/A/P to be ordered by primary team  --Patient will need additional testing after discharge (Factor V Leiden, Prothrombin Gene Mutation, JAK2 with reflex)

## 2021-06-06 NOTE — PROGRESS NOTE ADULT - PROBLEM SELECTOR PLAN 5
DVT and gI PPX     Differential diagnosis and plan of care discussed with patient after the evaluation.   Advanced care planning/advanced directives discussed with patient/family. DNR status including forceful chest compressions to attempt to restart the heart, ventilator support/artificial breathing, electric shock, artificial nutrition, health care proxy, Molst form all discussed with pt. Pt wishes to consider.   OMT on six regions for acute somatic dysfunctions done at the bedside   Importance of Fall prevention discussed.  I had a prolonged conversation with patient. More than 50% of the visit was spent counseling and/or coordinating care by the attending physician.

## 2021-06-07 LAB
AT III ACT/NOR PPP CHRO: 109 % — SIGNIFICANT CHANGE UP (ref 85–135)
DRVVT SCREEN TO CONFIRM RATIO: SIGNIFICANT CHANGE UP
LA NT DPL PPP QL: 50.9 SEC — SIGNIFICANT CHANGE UP
NORMALIZED SCT PPP-RTO: 0.72 RATIO — SIGNIFICANT CHANGE UP (ref 0–1.16)
NORMALIZED SCT PPP-RTO: SIGNIFICANT CHANGE UP
PROT C ACT/NOR PPP: 79 % — SIGNIFICANT CHANGE UP (ref 74–150)
PROT S FREE AG PPP IA-ACNC: 91 % — SIGNIFICANT CHANGE UP (ref 67–141)

## 2021-06-11 RX ORDER — APIXABAN 2.5 MG/1
1 TABLET, FILM COATED ORAL
Qty: 60 | Refills: 0
Start: 2021-06-11 | End: 2021-07-10

## 2021-06-14 LAB — PARANEOPLASTIC AB PNL SER: SIGNIFICANT CHANGE UP

## 2021-07-07 ENCOUNTER — APPOINTMENT (OUTPATIENT)
Dept: NEPHROLOGY | Facility: CLINIC | Age: 46
End: 2021-07-07

## 2021-07-20 ENCOUNTER — NON-APPOINTMENT (OUTPATIENT)
Age: 46
End: 2021-07-20

## 2021-07-30 NOTE — ED ADULT TRIAGE NOTE - NSTRIAGECARE_GEN_A_ER
Garrison from [facility] called to get report ;he stated intake will call with the receiving doctor.   Face Mask

## 2021-11-03 ENCOUNTER — NON-APPOINTMENT (OUTPATIENT)
Age: 46
End: 2021-11-03

## 2021-11-03 ENCOUNTER — APPOINTMENT (OUTPATIENT)
Dept: NEPHROLOGY | Facility: CLINIC | Age: 46
End: 2021-11-03
Payer: COMMERCIAL

## 2021-11-03 VITALS
TEMPERATURE: 97.2 F | OXYGEN SATURATION: 100 % | WEIGHT: 213 LBS | HEIGHT: 72 IN | SYSTOLIC BLOOD PRESSURE: 148 MMHG | HEART RATE: 65 BPM | DIASTOLIC BLOOD PRESSURE: 78 MMHG | BODY MASS INDEX: 28.85 KG/M2

## 2021-11-03 VITALS — SYSTOLIC BLOOD PRESSURE: 130 MMHG | DIASTOLIC BLOOD PRESSURE: 80 MMHG

## 2021-11-03 DIAGNOSIS — R79.89 OTHER SPECIFIED ABNORMAL FINDINGS OF BLOOD CHEMISTRY: ICD-10-CM

## 2021-11-03 DIAGNOSIS — N18.9 CHRONIC KIDNEY DISEASE, UNSPECIFIED: ICD-10-CM

## 2021-11-03 DIAGNOSIS — N28.1 CYST OF KIDNEY, ACQUIRED: ICD-10-CM

## 2021-11-03 DIAGNOSIS — R03.0 ELEVATED BLOOD-PRESSURE READING, W/OUT DIAGNOSIS OF HYPERTENSION: ICD-10-CM

## 2021-11-03 PROCEDURE — 99214 OFFICE O/P EST MOD 30 MIN: CPT | Mod: GC

## 2021-11-03 NOTE — PHYSICAL EXAM
[General Appearance - Alert] : alert [General Appearance - In No Acute Distress] : in no acute distress [General Appearance - Well Nourished] : well nourished [General Appearance - Well Developed] : well developed [Sclera] : the sclera and conjunctiva were normal [PERRL With Normal Accommodation] : pupils were equal in size, round, and reactive to light [Extraocular Movements] : extraocular movements were intact [Neck Appearance] : the appearance of the neck was normal [Jugular Venous Distention Increased] : there was no jugular-venous distention [] : no respiratory distress [Respiration, Rhythm And Depth] : normal respiratory rhythm and effort [Exaggerated Use Of Accessory Muscles For Inspiration] : no accessory muscle use [Auscultation Breath Sounds / Voice Sounds] : lungs were clear to auscultation bilaterally [Apical Impulse] : the apical impulse was normal [Heart Rate And Rhythm] : heart rate was normal and rhythm regular [Heart Sounds] : normal S1 and S2 [Heart Sounds Gallop] : no gallops [Murmurs] : no murmurs [Heart Sounds Pericardial Friction Rub] : no pericardial rub [Edema] : there was no peripheral edema [Bowel Sounds] : normal bowel sounds [Abdomen Soft] : soft [Abdomen Tenderness] : non-tender [No CVA Tenderness] : no ~M costovertebral angle tenderness [No Spinal Tenderness] : no spinal tenderness [Abnormal Walk] : normal gait [No Focal Deficits] : no focal deficits [Oriented To Time, Place, And Person] : oriented to person, place, and time [Impaired Insight] : insight and judgment were intact [Affect] : the affect was normal [Mood] : the mood was normal

## 2021-11-03 NOTE — HISTORY OF PRESENT ILLNESS
[FreeTextEntry1] : Last nephrology clinic visit was on 3/16/21. \par \par Since last visit has seen Dr Solares his PMD.  Also developed PE since last visit\par Reports he had COVID infection was in March 2020. COVID vaccine #2 in April  [employer]. No car rides or plane rides since then. Recently diagnosed with b/l PE & L DVT in July 2021. On eliquis 5mg bid since then. Also on fish oil. \par \par Renal biopsy on 9/14/20 which showed moderate arterial and arteriolar sclerosis, IFTA 5%, no evidence of immune disease/ATN/AIN\par \par Today patient is without any complaints\par Pt currently not taking any supplement/OTC. No energy drinks/ licorice, NSAIDs. Denies tumeric tablets and nothing herbal \par Exercising 5 days per week- home gym; Feels his weight has increased; Has muscle mass; \par Coffee 2-3 times/day; Does have regular salt in diet intermittently for breakfasts\par Does not check BP at home.  BP in office is 148/78 with repeat manually 130/80. \par ROS neg \par \par \par \par \par

## 2021-11-03 NOTE — ASSESSMENT
[FreeTextEntry1] : \par 45 year old male, muscle mass, AA here for elevated cr follow up:\par \par # elevated creatinine,   possible related to MARYCHUY ATN in past (was on multiple supplements) + muscle mass\par - Serum Cr trend: 1.5 (2015), 1.7 (2017), 1.9 (6/2020), 1.75 (07/2020), 1.57 (10/2020), 1.37 Nov 2020, 1.5 in March 2021; elevated CPK 1336\par - Renal biopsy (9/14/20) showed moderate arterial and arteriolar sclerosis, IFTA 5%, no evidence of immune disease/ATN/AIN\par - Abnormal cr 2-3 years per patient, was using creatinine supplements>5years, , exercises frequently and covid19 infection end of March 2020 with MARYCHUY and then downtrending creatinine.  \par - was taking tumeric pills for years but advised also to stop on prior visit which he did \par -advised to stay away from other herbal, OTC ,creatinine supplements, or NSAID, advised to avoid IV contrast\par creatinine stopped march 2020\par - UA neg prot/blood in June\par -renal sono smallish kidneys- he is approx 6 ft tall\par - GN serology negative\par - check renal panel, UA, UP/Cr, Cystatin-c\par \par #h/o elevated BP, no h/o htn\par -Does not check BP at home.  BP in office is 148/78 with repeat manually 130/80. \par -Due to presence of arteriosclerosis & hypertensive changes in the kidney on renal bx will add lisinopril 5mg for better BP control \par - Advised to check BP trends at home;\par -Labs done today. Repeat renal panel in 2 weeks after starting ACEI\par \par #renal right kidney complex cyst; sono in june 2020\par - urology and follow up q 6mos per urology. \par \par #Hypercoagulable state- \par Had covid19 infection end of March 2020\par COVID vaccine #2 in April  [employer].  \par Recently diagnosed with b/l PE & L DVT in July 2021. On eliquis 5mg bid since then.\par Would check Anticardiolipin IgG & igM, Beta 2GP, DRVVT\par \par \par

## 2021-11-04 PROBLEM — N18.9 CKD (CHRONIC KIDNEY DISEASE): Status: ACTIVE | Noted: 2020-06-19

## 2021-11-05 LAB
25(OH)D3 SERPL-MCNC: 35.3 NG/ML
ALBUMIN SERPL ELPH-MCNC: 4.7 G/DL
ANION GAP SERPL CALC-SCNC: 15 MMOL/L
APPEARANCE: CLEAR
B2 GLYCOPROT1 IGA SERPL IA-ACNC: 5.4 SAU
B2 GLYCOPROT1 IGG SER-ACNC: <5 SGU
B2 GLYCOPROT1 IGM SER-ACNC: <5 SMU
BACTERIA: NEGATIVE
BASOPHILS # BLD AUTO: 0.08 K/UL
BASOPHILS NFR BLD AUTO: 1.1 %
BILIRUBIN URINE: NEGATIVE
BLOOD URINE: NEGATIVE
BUN SERPL-MCNC: 18 MG/DL
CALCIUM SERPL-MCNC: 9.8 MG/DL
CARDIOLIPIN IGM SER-MCNC: 5.2 MPL
CARDIOLIPIN IGM SER-MCNC: <5 GPL
CHLORIDE SERPL-SCNC: 104 MMOL/L
CO2 SERPL-SCNC: 22 MMOL/L
COLOR: NORMAL
CONFIRM: 33.5 SEC
CREAT SERPL-MCNC: 1.71 MG/DL
CREAT SPEC-SCNC: 80 MG/DL
CREAT/PROT UR: 0.1 RATIO
CYSTATIN C SERPL-MCNC: 0.81 MG/L
DRVVT IMM 1:2 NP PPP: NORMAL
DRVVT SCREEN TO CONFIRM RATIO: 0.82 RATIO
EOSINOPHIL # BLD AUTO: 0.32 K/UL
EOSINOPHIL NFR BLD AUTO: 4.3 %
FERRITIN SERPL-MCNC: 162 NG/ML
GFR/BSA.PRED SERPLBLD CYS-BASED-ARV: 108 ML/MIN
GLUCOSE QUALITATIVE U: NEGATIVE
GLUCOSE SERPL-MCNC: 87 MG/DL
HCT VFR BLD CALC: 44 %
HGB BLD-MCNC: 14.3 G/DL
HYALINE CASTS: 0 /LPF
IMM GRANULOCYTES NFR BLD AUTO: 0.3 %
IRON SATN MFR SERPL: 24 %
IRON SERPL-MCNC: 66 UG/DL
KETONES URINE: NEGATIVE
LEUKOCYTE ESTERASE URINE: NEGATIVE
LYMPHOCYTES # BLD AUTO: 2.19 K/UL
LYMPHOCYTES NFR BLD AUTO: 29.6 %
MAN DIFF?: NORMAL
MCHC RBC-ENTMCNC: 28 PG
MCHC RBC-ENTMCNC: 32.5 GM/DL
MCV RBC AUTO: 86.3 FL
MICROSCOPIC-UA: NORMAL
MONOCYTES # BLD AUTO: 0.62 K/UL
MONOCYTES NFR BLD AUTO: 8.4 %
NEUTROPHILS # BLD AUTO: 4.18 K/UL
NEUTROPHILS NFR BLD AUTO: 56.3 %
NITRITE URINE: NEGATIVE
PH URINE: 6.5
PHOSPHATE SERPL-MCNC: 2.8 MG/DL
PLATELET # BLD AUTO: 247 K/UL
POTASSIUM SERPL-SCNC: 4.1 MMOL/L
PROT UR-MCNC: 5 MG/DL
PROTEIN URINE: NEGATIVE
RBC # BLD: 5.1 M/UL
RBC # FLD: 14.6 %
RED BLOOD CELLS URINE: 0 /HPF
SCREEN DRVVT: 36.8 SEC
SODIUM SERPL-SCNC: 141 MMOL/L
SPECIFIC GRAVITY URINE: 1.01
SQUAMOUS EPITHELIAL CELLS: 0 /HPF
TIBC SERPL-MCNC: 276 UG/DL
UIBC SERPL-MCNC: 210 UG/DL
UROBILINOGEN URINE: NORMAL
WBC # FLD AUTO: 7.41 K/UL
WHITE BLOOD CELLS URINE: 0 /HPF

## 2021-11-10 LAB — CARDIOLIPIN AB SER IA-ACNC: NEGATIVE

## 2022-01-26 ENCOUNTER — APPOINTMENT (OUTPATIENT)
Dept: NEPHROLOGY | Facility: CLINIC | Age: 47
End: 2022-01-26
Payer: COMMERCIAL

## 2022-01-26 DIAGNOSIS — R79.89 OTHER SPECIFIED ABNORMAL FINDINGS OF BLOOD CHEMISTRY: ICD-10-CM

## 2022-01-26 DIAGNOSIS — I10 ESSENTIAL (PRIMARY) HYPERTENSION: ICD-10-CM

## 2022-01-26 PROCEDURE — 99213 OFFICE O/P EST LOW 20 MIN: CPT | Mod: 95

## 2022-01-26 NOTE — HISTORY OF PRESENT ILLNESS
[Home] : at home, [unfilled] , at the time of the visit. [Medical Office: ([facility])___] : at the medical office located in  [Verbal consent obtained from patient] : the patient, [unfilled] [FreeTextEntry1] : TEB done today\par  \par Renal biopsy on 9/14/20 which showed moderate arterial and arteriolar sclerosis, IFTA 5%, no evidence of immune disease/ATN/AIN\par \par Today patient is without any complaints\par Pt currently not taking any supplement/OTC. No energy drinks/ licorice, NSAIDs. Denies tumeric tablets and nothing herbal \par Exercising 4-5 days per week- home gym; Feels his weight has been maintained; Has muscle mass but denies any increase or decrease; \par He has cut down on Coffee 1-2 times/day (from 2-3x/day); Does have regular salt in diet intermittently  \par  \par Checks BP at home: Reports 133/80, 112/59 125/62, 124/70, 133/80; on average 120-130systolic\par Reports  systolic on weekend mornings, higher 120-133 on weekday evenings 6pm; Does not check weekday mornings\par Takes Lisinopril 5mg daily 8-9pm\par \par Other ROS neg

## 2022-01-26 NOTE — ASSESSMENT
[FreeTextEntry1] : 46 year old male, muscle mass, AA here for elevated cr follow up:\par \par # elevated creatinine, possible related to MARYCHUY ATN in past (was on multiple supplements) + muscle mass; Last Cystatin C \par - Serum Cr trend: 1.5 (2015), 1.7 (2017), 1.9 (6/2020), 1.75 (07/2020), 1.57 (10/2020), 1.37 Nov 2020, 1.5 in March 2021; elevated CPK 1336\par - Renal biopsy (9/14/20) showed moderate arterial and arteriolar sclerosis, IFTA 5%, no evidence of immune disease/ATN/AIN\par - Abnormal cr 2-3 years per patient, was using creatinine supplements>5years, , exercises frequently and covid19 infection end of March 2020 with MARYCHUY and then downtrending creatinine. \par - was taking tumeric pills for years but advised also to stop on prior visit which he did \par -advised to stay away from other herbal, OTC ,creatinine supplements, or NSAID, advised to avoid IV contrast\par creatinine stopped march 2020\par - UA neg prot/blood in June\par -renal sono smallish kidneys- he is approx 6 ft tall\par - GN serology negative\par - check renal panel, UA, UP/Cr, Cystatin-c\par \par #o htn\par -Due to presence of arteriosclerosis & hypertensive changes in the kidney on renal bx will increase lisinopril from 5mg to 10mg for better BP control \par -Goal 120s for him\par - Advised to check BP trends at home;\par -Repeat renal panel in 2 weeks after starting ACEI\par \par #renal right kidney complex cyst; sono in june 2020\par - urology and follow up q 6mos per urology. \par \par #Hypercoagulable state- \par Had covid19 infection end of March 2020\par COVID vaccine #2 in April Pfizer. \par Recently diagnosed with b/l PE & L DVT in July 2021. On eliquis 5mg bid since then. Follow up with heme\par Would check Anticardiolipin IgG & igM, Beta 2GP, DRVVT\par \par #requests testosterone level to be chcked and he will d/w pmd

## 2022-02-23 ENCOUNTER — NON-APPOINTMENT (OUTPATIENT)
Age: 47
End: 2022-02-23

## 2022-05-03 LAB
25(OH)D3 SERPL-MCNC: 34.8 NG/ML
ALBUMIN SERPL ELPH-MCNC: 4.9 G/DL
ALBUMIN SERPL ELPH-MCNC: 4.9 G/DL
ALP BLD-CCNC: 74 U/L
ALT SERPL-CCNC: 44 U/L
ANION GAP SERPL CALC-SCNC: 13 MMOL/L
APPEARANCE: CLEAR
AST SERPL-CCNC: 42 U/L
B2 GLYCOPROT1 IGA SERPL IA-ACNC: 5.8 SAU
B2 GLYCOPROT1 IGG SER-ACNC: <5 SGU
B2 GLYCOPROT1 IGM SER-ACNC: <5 SMU
BACTERIA: NEGATIVE
BASOPHILS # BLD AUTO: 0.06 K/UL
BASOPHILS NFR BLD AUTO: 0.9 %
BILIRUB DIRECT SERPL-MCNC: 0.1 MG/DL
BILIRUB INDIRECT SERPL-MCNC: 0.3 MG/DL
BILIRUB SERPL-MCNC: 0.4 MG/DL
BILIRUBIN URINE: NEGATIVE
BLOOD URINE: NEGATIVE
BUN SERPL-MCNC: 21 MG/DL
CALCIUM SERPL-MCNC: 10.2 MG/DL
CARDIOLIPIN AB SER IA-ACNC: NEGATIVE
CARDIOLIPIN IGM SER-MCNC: 5.9 MPL
CARDIOLIPIN IGM SER-MCNC: <5 GPL
CHLORIDE SERPL-SCNC: 100 MMOL/L
CK SERPL-CCNC: 1548 U/L
CO2 SERPL-SCNC: 23 MMOL/L
COLOR: NORMAL
CONFIRM: 41.1 SEC
CREAT SERPL-MCNC: 1.55 MG/DL
CREAT SPEC-SCNC: 70 MG/DL
CREAT/PROT UR: 0.1 RATIO
DEPRECATED CARDIOLIPIN IGA SER: 11.5 APL
DRVVT IMM 1:2 NP PPP: NORMAL
DRVVT SCREEN TO CONFIRM RATIO: 0.81 RATIO
EOSINOPHIL # BLD AUTO: 0.32 K/UL
EOSINOPHIL NFR BLD AUTO: 5 %
ESTIMATED AVERAGE GLUCOSE: 120 MG/DL
FERRITIN SERPL-MCNC: 226 NG/ML
GLUCOSE QUALITATIVE U: NEGATIVE
GLUCOSE SERPL-MCNC: 88 MG/DL
HBA1C MFR BLD HPLC: 5.8 %
HCT VFR BLD CALC: 43.9 %
HGB BLD-MCNC: 14.9 G/DL
HYALINE CASTS: 0 /LPF
IMM GRANULOCYTES NFR BLD AUTO: 0.2 %
IRON SATN MFR SERPL: 26 %
IRON SERPL-MCNC: 74 UG/DL
KETONES URINE: NEGATIVE
LEUKOCYTE ESTERASE URINE: NEGATIVE
LYMPHOCYTES # BLD AUTO: 2.3 K/UL
LYMPHOCYTES NFR BLD AUTO: 35.9 %
MAGNESIUM SERPL-MCNC: 2.2 MG/DL
MAN DIFF?: NORMAL
MCHC RBC-ENTMCNC: 28.6 PG
MCHC RBC-ENTMCNC: 33.9 GM/DL
MCV RBC AUTO: 84.3 FL
MICROSCOPIC-UA: NORMAL
MONOCYTES # BLD AUTO: 0.64 K/UL
MONOCYTES NFR BLD AUTO: 10 %
NEUTROPHILS # BLD AUTO: 3.07 K/UL
NEUTROPHILS NFR BLD AUTO: 48 %
NITRITE URINE: NEGATIVE
PH URINE: 6
PHOSPHATE SERPL-MCNC: 3.6 MG/DL
PLATELET # BLD AUTO: 289 K/UL
POTASSIUM SERPL-SCNC: 4.5 MMOL/L
PROT SERPL-MCNC: 7.5 G/DL
PROT UR-MCNC: 5 MG/DL
PROTEIN URINE: NEGATIVE
RBC # BLD: 5.21 M/UL
RBC # FLD: 13.3 %
RED BLOOD CELLS URINE: 0 /HPF
SCREEN DRVVT: 44.5 SEC
SODIUM SERPL-SCNC: 137 MMOL/L
SPECIFIC GRAVITY URINE: 1.01
SQUAMOUS EPITHELIAL CELLS: 0 /HPF
TIBC SERPL-MCNC: 279 UG/DL
UIBC SERPL-MCNC: 206 UG/DL
UROBILINOGEN URINE: NORMAL
WBC # FLD AUTO: 6.4 K/UL
WHITE BLOOD CELLS URINE: 0 /HPF

## 2022-09-26 RX ORDER — LISINOPRIL 10 MG/1
10 TABLET ORAL
Qty: 90 | Refills: 2 | Status: ACTIVE | COMMUNITY
Start: 2021-11-03 | End: 1900-01-01

## 2023-02-09 NOTE — H&P ADULT - NSICDXPASTSURGICALHX_GEN_ALL_CORE_FT
[facility]  eMERGENCY dEPARTMENT eNCOUnter      Pt Name: [place]  MRN: [id number]  [place] 2008  Date of evaluation: 2/9/2023  Provider: Melody Serrano MD    [address] [place] [address]       Chief Complaint   Patient presents with    Otalgia     Right ear started 4 days ago. HISTORY OF PRESENT ILLNESS   (Location/Symptom, Timing/Onset, Context/Setting, Quality, Duration, Modifying Factors, Severity)  Note limiting factors. History obtained from: the patients mother and the patient    [place] is a 12 y.o. male with hx of recurrent ear infections with tympanostomy tubes in place however mother denies any recent ear infections in the past few weeks who presents due to 4 days of right ear pain and sore throat. No fever no difficulty breathing or difficulty swallowing. HPI    Nursing Notes were reviewed. REVIEW OFSYSTEMS    (2-9 systems for level 4, 10 or more for level 5)     Review of Systems   Constitutional:  Negative for fever. HENT:  Positive for ear pain and sore throat. Negative for drooling, trouble swallowing and voice change. Eyes:  Negative for visual disturbance. Respiratory:  Negative for shortness of breath and wheezing. Cardiovascular:  Negative for chest pain and palpitations. Neurological:  Negative for seizures and syncope. Psychiatric/Behavioral:  Negative for self-injury and suicidal ideas. Except as noted above the remainder of the review of systems was reviewed and negative.        PAST MEDICAL HISTORY     Past Medical History:   Diagnosis Date    Autism     Cognitive disorder     Influenza B 02/09/2020         SURGICAL HISTORY       Past Surgical History:   Procedure Laterality Date    ADENOIDECTOMY      DENTAL SURGERY      TONSILLECTOMY      TYMPANOSTOMY TUBE PLACEMENT           CURRENT MEDICATIONS       Previous Medications    ONDANSETRON (ZOFRAN ODT) 4 MG DISINTEGRATING TABLET    Take 1 tablet by mouth every 8 hours as needed for Nausea or Vomiting       ALLERGIES     Patient has no known allergies. FAMILY HISTORY       Family History   Problem Relation Age of Onset    Neuropathy Mother     Emphysema Mother     Other Mother         emphysema    COPD Father     Atrial Fibrillation Father           SOCIAL HISTORY       Social History     Socioeconomic History    Marital status: Single     Spouse name: None    Number of children: None    Years of education: None    Highest education level: None   Tobacco Use    Smoking status: Passive Smoke Exposure - Never Smoker    Smokeless tobacco: Never   Vaping Use    Vaping Use: Never used   Substance and Sexual Activity    Alcohol use: No    Drug use: No    Sexual activity: Never         PHYSICAL EXAM    (up to 7 for level 4, 8 or more for level 5)     ED Triage Vitals [02/09/23 1325]   BP Temp Temp Source Heart Rate Resp SpO2 Height Weight - Scale   122/65 98.2 °F (36.8 °C) Oral 79 18 98 % 5' 5\" (1.651 m) 117 lb 11.6 oz (53.4 kg)       Physical Exam  Vitals and nursing note reviewed. Constitutional:       General: He is not in acute distress. Appearance: He is well-developed. HENT:      Head: Normocephalic and atraumatic. Ears:      Comments: Mild right TM erythema and bulging. No purulent material noted. Blue tympanostomy tube noted in the very superior portion of the external ear canal.  Cannot confirm if still in place. Mouth/Throat:      Comments: Mild posterior oropharynx erythema. No exudate. Uvula midline. Patient breathing normally, speaking normally, no signs of impending airway obstruction. Eyes:      Conjunctiva/sclera: Conjunctivae normal.   Neck:      Vascular: No JVD. Trachea: No tracheal deviation. Cardiovascular:      Rate and Rhythm: Normal rate. Pulmonary:      Effort: Pulmonary effort is normal. No respiratory distress. Skin:     General: Skin is warm and dry. Neurological:      Mental Status: He is alert.          EMERGENCY DEPARTMENT COURSE and DIFFERENTIAL DIAGNOSIS/MDM:   Vitals:    Vitals:    02/09/23 1325   BP: 122/65   Pulse: 79   Resp: 18   Temp: 98.2 °F (36.8 °C)   TempSrc: Oral   SpO2: 98%   Weight: 117 lb 11.6 oz (53.4 kg)   Height: 5' 5\" (1.651 m)       Patient was given the following medications:  Medications   amoxicillin (AMOXIL) capsule 500 mg (has no administration in time range)           CC/HPI Summary, DDx, ED Course, Reassessment, Disposition Considerations (include Tests not done, Admit vs D/C, Shared Decision Making, Pt Expectation of Test or Tx.):  Suspect likely otitis media and feel patient appropriate for amoxicillin, primary care and ENT follow-up, standard ER return precautions. I consider laboratory evaluation or advanced imaging but I feel that is indicated at this time based on patient's current vitals and physical exam.  School note provided. The fact that the patient's tympanostomy tube cannot be confirmed in the right location is discussed with the patient's mother and ENT follow-up is recommended as an outpatient. FINAL IMPRESSION      1. Recurrent acute serous otitis media of right ear          DISPOSITION/PLAN     DISPOSITION Decision To Discharge 02/09/2023 01:59:26 PM      PATIENT REFERRED TO:  Rima Helms MD  [address] [place] [address]  [phone]    In 1 week      [facility]  [address]  [phone]    If symptoms worsen      DISCHARGE MEDICATIONS:  New Prescriptions    AMOXICILLIN (AMOXIL) 500 MG CAPSULE    Take 1 capsule by mouth 3 times daily for 10 days              (Please note that portions of this note were completed with a voice recognition program.  Efforts were made to edit the dictations but occasionally words are mis-transcribed. )    Melody Serrano MD (electronically signed)  Attending Emergency Physician           Melody Serrano MD  02/09/23 8286 PAST SURGICAL HISTORY:  Knee Dislocation (ICD9 836.50)     Orthopedic Aftercare (ICD9 V54.9)

## 2023-06-08 ENCOUNTER — APPOINTMENT (OUTPATIENT)
Dept: SURGERY | Facility: CLINIC | Age: 48
End: 2023-06-08
Payer: COMMERCIAL

## 2023-06-08 VITALS
HEIGHT: 72 IN | OXYGEN SATURATION: 96 % | BODY MASS INDEX: 26.68 KG/M2 | DIASTOLIC BLOOD PRESSURE: 71 MMHG | HEART RATE: 67 BPM | WEIGHT: 197 LBS | TEMPERATURE: 98.2 F | SYSTOLIC BLOOD PRESSURE: 134 MMHG

## 2023-06-08 DIAGNOSIS — R10.33 PERIUMBILICAL PAIN: ICD-10-CM

## 2023-06-08 PROCEDURE — 99203 OFFICE O/P NEW LOW 30 MIN: CPT

## 2023-06-08 NOTE — PHYSICAL EXAM
[Normal Breath Sounds] : Normal breath sounds [Normal Heart Sounds] : normal heart sounds [Normal Rate and Rhythm] : normal rate and rhythm [No Rash or Lesion] : No rash or lesion [Abdominal Masses] : No abdominal masses [Abdomen Tenderness] : ~T ~M No abdominal tenderness [Tender] : was nontender [Enlarged] : not enlarged [Stool Sample Taken] : No stool obtained  on rectal exam [Alert] : alert [Oriented to Person] : oriented to person [Oriented to Place] : oriented to place [Oriented to Time] : oriented to time [Calm] : calm [de-identified] : Well appearing, AO x 3 [de-identified] : Abdomen soft, non-distended, non-tender. Discomfort on palpation of mid-abdomen. Bowel sounds present.

## 2023-06-08 NOTE — ASSESSMENT
[FreeTextEntry1] : OC is a 46yo M presenting with periumbilical hernia associated with 6mo of bloating and discomfort.

## 2023-06-08 NOTE — HISTORY OF PRESENT ILLNESS
[de-identified] : OC is a 48yo M presenting with 6 mo of periumbilical bloating and discomfort. US done 2 weeks prior revealed periumbilical hernia. Pt denies focal pain, nausea, vomiting, or changes in stool frequency and consistency. The area of discomfort has not changed in size since first noticed in January. Pt is an [employer] employee and lifts heavy weights 4-5x/week. \par \par PMH: Indirect inguinal hernia (infant). Pulmonary embolism (2018 post tibia fracture surgery, and 2020, also complication from tibia surgery)\par Meds: Baby aspirin, lisinopril.\par PSH: Tibia fracture correction (2018)\par Allergies: Seasonal\par Social: Never smoker, drinks alcohol occasionally. Exercises 4-5x/week. \par

## 2023-06-08 NOTE — PLAN
[FreeTextEntry1] : With normal US and periumbilical pain, will proceed with CT scan of the abdomen further evaluate the cause of the abdominal pain. Plan of care to follow after CT scan.

## 2023-06-13 ENCOUNTER — RESULT REVIEW (OUTPATIENT)
Age: 48
End: 2023-06-13

## 2023-06-17 ENCOUNTER — APPOINTMENT (OUTPATIENT)
Dept: CT IMAGING | Facility: CLINIC | Age: 48
End: 2023-06-17
Payer: COMMERCIAL

## 2023-06-17 ENCOUNTER — OUTPATIENT (OUTPATIENT)
Dept: OUTPATIENT SERVICES | Facility: HOSPITAL | Age: 48
LOS: 1 days | End: 2023-06-17
Payer: COMMERCIAL

## 2023-06-17 DIAGNOSIS — Z00.8 ENCOUNTER FOR OTHER GENERAL EXAMINATION: ICD-10-CM

## 2023-06-17 DIAGNOSIS — R10.33 PERIUMBILICAL PAIN: ICD-10-CM

## 2023-06-17 PROCEDURE — 74176 CT ABD & PELVIS W/O CONTRAST: CPT

## 2023-06-17 PROCEDURE — 74176 CT ABD & PELVIS W/O CONTRAST: CPT | Mod: 26

## 2025-01-21 NOTE — PROGRESS NOTE ADULT - PROBLEM/PLAN-2
DISPLAY PLAN FREE TEXT
182.88
DISPLAY PLAN FREE TEXT